# Patient Record
Sex: FEMALE | Race: WHITE | NOT HISPANIC OR LATINO | Employment: FULL TIME
[De-identification: names, ages, dates, MRNs, and addresses within clinical notes are randomized per-mention and may not be internally consistent; named-entity substitution may affect disease eponyms.]

---

## 2017-04-04 DIAGNOSIS — B00.1 COLD SORE: ICD-10-CM

## 2017-04-04 RX ORDER — VALACYCLOVIR HYDROCHLORIDE 1 G/1
TABLET, FILM COATED ORAL
Qty: 12 TABLET | Refills: 0 | Status: SHIPPED | OUTPATIENT
Start: 2017-04-04 | End: 2017-06-14

## 2017-04-04 NOTE — TELEPHONE ENCOUNTER
valACYclovir (VALTREX) 1000 mg tablet    Last Written Prescription Date:  8/1/16  Last Fill Quantity: 12,   # refills: 1  Last [place] [facility] [place]:  10/31/16 Post partum visit  Future Office visit: none    Routing refill request to provider for review/approval because:  Refill sent per [place] protocol

## 2017-06-03 ENCOUNTER — HEALTH MAINTENANCE LETTER (OUTPATIENT)
Age: 32
End: 2017-06-03

## 2017-11-13 ENCOUNTER — TRANSFERRED RECORDS (OUTPATIENT)
Dept: HEALTH INFORMATION MANAGEMENT | Facility: CLINIC | Age: 32
End: 2017-11-13

## 2017-12-02 ENCOUNTER — NURSE TRIAGE (OUTPATIENT)
Dept: NURSING | Facility: CLINIC | Age: 32
End: 2017-12-02

## 2017-12-02 NOTE — TELEPHONE ENCOUNTER
Sherie was due for menstruation a couple of days ago.  Wednesday started feeling nauseated and then diarrhea started on Wednesday.    No fever.  No vomiting.  No cold symptoms.

## 2017-12-02 NOTE — TELEPHONE ENCOUNTER
Additional Information    Negative: Shock suspected (e.g., cold/pale/clammy skin, too weak to stand, low BP, rapid pulse)    Negative: Difficult to awaken or acting confused  (e.g., disoriented, slurred speech)    Negative: Sounds like a life-threatening emergency to the triager    Negative: Vomiting also present and worse than the diarrhea    Negative: [1] Blood in stool AND [2] without diarrhea    Negative: [1] SEVERE abdominal pain (e.g., excruciating) AND [2] present > 1 hour    Negative: [1] SEVERE abdominal pain AND [2] age > 60    Negative: [1] Blood in the stool AND [2] moderate or large amount of blood    Negative: Black or tarry bowel movements  (Exception: chronic-unchanged  black-grey bowel movements AND is taking iron pills or Pepto-bismol)    Negative: [1] Drinking very little AND [2] dehydration suspected (e.g., no urine > 12 hours, very dry mouth, very lightheaded)    Negative: Patient sounds very sick or weak to the triager    Negative: [1] SEVERE diarrhea (e.g., 7 or more times / day more than normal) AND [2]  age > 60 years    Negative: [1] Constant abdominal pain AND [2] present > 2 hours    Negative: [1] Fever > 103 F (39.4 C) AND [2] not able to get the fever down using Fever Care Advice    Negative: [1] SEVERE diarrhea (e.g., 7 or more times / day more than normal) AND [2] present > 24 hours (1 day)    Negative: [1] MODERATE diarrhea (e.g., 4-6 times / day more than normal) AND [2] present > 48 hours (2 days)    Negative: [1] MODERATE diarrhea (e.g., 4-6 times / day more than normal) AND [2] age > 70 years    Negative: Fever > 101 F (38.3 C)    Negative: Fever present > 3 days (72 hours)    Negative: Abdominal pain  (Exception: Pain clears with each passage of diarrhea stool)    Negative: [1] Blood in the stool AND [2] small amount of blood   (Exception: only on toilet paper. Reason: diarrhea can cause rectal irritation with blood on wiping)    Negative: [1] Mucus or pus in stool AND [2]  present > 2 days AND [3] diarrhea is more than mild    Negative: [1] Recent antibiotic therapy (i.e., within last 2 months) AND [2] > 3 days since antibiotic was stopped    Negative: Weak immune system (e.g., HIV positive, cancer chemo, splenectomy, organ transplant, chronic steroids)    Negative: Tube feedings (e.g., nasogastric, g-tube, j-tube)    Negative: Travel to a foreign country in past month    Negative: [1] MILD (e.g., 1-3 or more stools than normal in past 24 hours) diarrhea without known cause AND [2] present >  7 days    Negative: Diarrhea is a chronic symptom (recurrent or ongoing AND present > 4 weeks)    Negative: SEVERE diarrhea (e.g., 7 or more times / day more than normal) (all triage questions negative)    MILD-MODERATE diarrhea (e.g., 1-6 times / day more than normal) (all triage questions negative)    Protocols used: DIARRHEA-ADULT-

## 2018-01-13 DIAGNOSIS — B00.1 COLD SORE: ICD-10-CM

## 2018-01-17 RX ORDER — VALACYCLOVIR HYDROCHLORIDE 1 G/1
TABLET, FILM COATED ORAL
Qty: 12 TABLET | Refills: 0 | OUTPATIENT
Start: 2018-01-17

## 2018-01-17 NOTE — TELEPHONE ENCOUNTER
valACYclovir (VALTREX) 1000 mg tablet     Last Written Prescription Date:  6/15/17  Last Fill Quantity: 12,   # refills: 1  [place] [facility]:  10/31/16  Future Office visit: none    Routing refill request to provider for review/approval because:  Pt needs appointment. Refill denied.

## 2018-01-23 RX ORDER — VALACYCLOVIR HYDROCHLORIDE 1 G/1
TABLET, FILM COATED ORAL
Qty: 12 TABLET | Refills: 0 | Status: SHIPPED | OUTPATIENT
Start: 2018-01-23 | End: 2018-02-26

## 2018-02-26 ENCOUNTER — OFFICE VISIT (OUTPATIENT)
Dept: OBGYN | Facility: CLINIC | Age: 33
End: 2018-02-26
Payer: COMMERCIAL

## 2018-02-26 VITALS
HEART RATE: 68 BPM | DIASTOLIC BLOOD PRESSURE: 64 MMHG | WEIGHT: 183 LBS | SYSTOLIC BLOOD PRESSURE: 112 MMHG | HEIGHT: 67 IN | BODY MASS INDEX: 28.72 KG/M2

## 2018-02-26 DIAGNOSIS — Z01.419 ENCOUNTER FOR GYNECOLOGICAL EXAMINATION WITHOUT ABNORMAL FINDING: Primary | ICD-10-CM

## 2018-02-26 DIAGNOSIS — Z31.9 PROCREATIVE MANAGEMENT: ICD-10-CM

## 2018-02-26 DIAGNOSIS — B00.1 COLD SORE: ICD-10-CM

## 2018-02-26 PROCEDURE — 99395 PREV VISIT EST AGE 18-39: CPT | Performed by: OBSTETRICS & GYNECOLOGY

## 2018-02-26 PROCEDURE — G0145 SCR C/V CYTO,THINLAYER,RESCR: HCPCS | Performed by: OBSTETRICS & GYNECOLOGY

## 2018-02-26 PROCEDURE — 87624 HPV HI-RISK TYP POOLED RSLT: CPT | Performed by: OBSTETRICS & GYNECOLOGY

## 2018-02-26 ASSESSMENT — ANXIETY QUESTIONNAIRES
IF YOU CHECKED OFF ANY PROBLEMS ON THIS QUESTIONNAIRE, HOW DIFFICULT HAVE THESE PROBLEMS MADE IT FOR YOU TO DO YOUR WORK, TAKE CARE OF THINGS AT HOME, OR GET ALONG WITH OTHER PEOPLE: NOT DIFFICULT AT ALL
5. BEING SO RESTLESS THAT IT IS HARD TO SIT STILL: NOT AT ALL
1. FEELING NERVOUS, ANXIOUS, OR ON EDGE: NOT AT ALL
GAD7 TOTAL SCORE: 1
7. FEELING AFRAID AS IF SOMETHING AWFUL MIGHT HAPPEN: SEVERAL DAYS
2. NOT BEING ABLE TO STOP OR CONTROL WORRYING: NOT AT ALL
3. WORRYING TOO MUCH ABOUT DIFFERENT THINGS: NOT AT ALL
6. BECOMING EASILY ANNOYED OR IRRITABLE: NOT AT ALL

## 2018-02-26 ASSESSMENT — PATIENT HEALTH QUESTIONNAIRE - PHQ9: 5. POOR APPETITE OR OVEREATING: NOT AT ALL

## 2018-02-26 NOTE — MR AVS SNAPSHOT
"              After Visit Summary   2018    Sherie Young    MRN: [id number]           Patient Information     Date Of Birth          1985        Visit Information        Provider Department      2018 2:00 PM Debby Sen MD [facility]        Today's Diagnoses     Encounter for gynecological examination without abnormal finding    -  1    Cold sore        Procreative management           Follow-ups after your visit        Who to contact     If you have questions or need follow up information about today's clinic visit or your schedule please contact [facility] directly at [phone].  Normal or non-critical lab and imaging results will be communicated to you by [employer], letter or phone within 4 business days after the clinic has received the results. If you do not hear from us within 7 days, please contact the clinic through [employer] or phone. If you have a critical or abnormal lab result, we will notify you by phone as soon as possible.  Submit refill requests through [employer] or call your pharmacy and they will forward the refill request to us. Please allow 3 business days for your refill to be completed.          Additional Information About Your Visit        MyChart Information     [employer] lets you send messages to your doctor, view your test results, renew your prescriptions, schedule appointments and more. To sign up, go to [email or web address] . Click on \"Log in\" on the left side of the screen, which will take you to the Welcome page. Then click on \"Sign up Now\" on the right side of the page.     You will be asked to enter the access code listed below, as well as some personal information. Please follow the directions to create your username and password.     Your access code is: [email or web address]  Expires: 2018 12:35 PM     Your access code will  in 90 days. If you need help or a new code, please call your [place] clinic or " "[phone].        Care EveryWhere ID     This is your Care EveryWhere ID. This could be used by other organizations to access your [facility] medical records  [id number]        Your Vitals Were     Pulse Height Last Period Breastfeeding? BMI (Body Mass Index)       68 5' 7\" (1.702 m) 02/07/2018 No 28.66 kg/m2        Blood Pressure from Last 3 Encounters:   02/26/18 112/64   10/31/16 120/80   09/26/16 135/78    Weight from Last 3 Encounters:   02/26/18 183 lb (83 kg)   10/31/16 203 lb (92.1 kg)   09/19/16 222 lb (100.7 kg)              We Performed the Following     HPV High Risk Types DNA Cervical     Pap imaged thin layer screen with HPV - recommended age 30 - 65          Where to get your medicines      These medications were sent to [facility] [address] [place] [address] [facility] & [address] [address]     Phone:  [phone]     valACYclovir 1000 mg tablet          Primary Care Provider Office Phone # Fax #    Debby Sen [phone] [address] [address]        Equal Access to Services     LUCIA CAMPBELL AH: Hadii devora mao hadasho Soomaali, waaxda luqadaha, qaybta kaalmada adeegyada, inez santiago. So [facility] [phone].    ATENCIÓN: Si habla español, tiene a reyna disposición servicios gratuitos de asistencia lingüística. Llame al [phone].    We comply with applicable federal civil rights laws and [place] laws. We do not discriminate on the basis of race, color, national origin, age, disability, sex, sexual orientation, or gender identity.            Thank you!     Thank you for choosing [facility]  for your care. Our goal is always to provide you with excellent care. Hearing back from our patients is one way we can continue to improve our services. Please take a few minutes to complete the written survey that you may receive in the mail after your visit " with us. Thank you!             Your Updated Medication List - Protect others around you: Learn how to safely use, store and throw away your medicines at www.disposemymeds.org.          This list is accurate as of 2/26/18 11:59 PM.  Always use your most recent med list.                   Brand Name Dispense Instructions for use Diagnosis    Albuterol Sulfate 108 (90 BASE) MCG/ACT Aepb           Prenatal Vitamins 28-0.8 MG Tabs      Take 1 tablet by mouth daily        valACYclovir 1000 mg tablet    VALTREX    12 tablet    TAKE 2 TABLETS BY MOUTH TWICE DAILY FOR 1 DAYS    Cold sore

## 2018-02-26 NOTE — PROGRESS NOTES
Sherie is a 32 year old  female who presents for annual exam.     Besides routine health maintenance,  she would like to discuss traveling and Zeka.    HPI:  The patient's PCP is  Debby Sen MD.    Patient is doing great. Work life balance is definitely a struggle now that has a toddler but loving every minute of it.   Doing some traveling for work and needing to go to [facility] and [facility]. Sister is also getting  next fall and wants to do a trip to [facility] for a bachelorette party. Trying for another baby but also needs to figure out the logistics of those things. Questions about Zika, travel, etc  Patient has been off her ocps for several months.  Periods are regular and doing opks for about 3 months or so and getting a pos on day 8-11 or so. Didn't do opks with letha until after already pregnant and started on day 15 with him so thinks that she's always ovulated a little on early side b/c never got a positive OPK that month and had already likely conceived. Got pregnant with letha month 2 after her SAB and got pregnant right away with the SAB  Didn't contracept this month but didn't necessarily try b/c that would make her EDC right at her sister's wedding. Too early to check a UPT for this month as her period was about 3.5 weeks ago.    Has worked really hard on diet and doing 90% vegan and low carb. Working out but sporadically b/c not enough time. Has gotten back to about the weight she was when got preg with letha. Ideally ould like to be 160s-170s but not going to stress about it until done having kids.    Patient has a strong fhx of breast cancer and ovarian cancer on her dad's side. Dad has refused to get genetic testing of any kind but has always been asked in a big group setting and never one on one so may try again tonight b/c seeing him. Thinks she'd probably just test herself if he won't      GYNECOLOGIC HISTORY:    Her current contraception method is:  none  She  reports that  she has never smoked. She has never used smokeless tobacco.    Patient is sexually active.  STD testing offered?  Declined  Last PHQ-9 score on record =   PHQ-9 SCORE 2018   Total Score 2     Last GAD7 score on record =   MEDINA-7 SCORE 2018   Total Score 1     Alcohol Score = 0    HEALTH MAINTENANCE:  Cholesterol: (No results found for: CHOL Has done @ Work yearly.  Last Mammo: Never had done, Result: not applicable, Next Mammo: Due at age 38.  Pap: 2013 Neg, HPV Neg  Colonoscopy:  Never had done, Result: not applicable, Next Colonoscopy: Due at age 48.  Dexa:  NA    Health maintenance updated:  yes    HISTORY:  Obstetric History       T1      L1     SAB1   TAB0   Ectopic0   Multiple0   Live Births1       # Outcome Date GA Lbr Ashish/2nd Weight Sex Delivery Anes PTL Lv   2 Term 16 40w0d 07:20 / 05:29 7 lb 13.9 oz (3.57 kg) M CS-LTranv EPI N VONDA      Name: Dustin      Apgar1:  6                Apgar5: 8   1 SAB 09/01/15     SAB             Patient Active Problem List   Diagnosis     Cold sore     Past Surgical History:   Procedure Laterality Date      SECTION N/A 2016    Procedure:  SECTION;  Surgeon: Debby Sen MD;  Location:  L+D     NO HISTORY OF SURGERY        Social History   Substance Use Topics     Smoking status: Never Smoker     Smokeless tobacco: Never Used     Alcohol use No      Problem (# of Occurrences) Relation (Name,Age of Onset)    Breast Cancer (2) Paternal Aunt, Other: PGGM    Ovarian Cancer (2) Paternal Grandmother, Paternal Aunt    Prostate Cancer (1) Paternal Uncle (50)            Current Outpatient Prescriptions   Medication Sig     valACYclovir (VALTREX) 1000 mg tablet TAKE 2 TABLETS BY MOUTH TWICE DAILY FOR 1 DAYS     norethindrone (MICRONOR) 0.35 MG per tablet Take 1 tablet (0.35 mg) by mouth daily     Albuterol Sulfate 108 (90 BASE) MCG/ACT AEPB      Prenatal Vit-Fe Fumarate-FA (PRENATAL VITAMINS) 28-0.8 MG TABS Take 1 tablet by mouth  "daily      No current facility-administered medications for this visit.      Allergies   Allergen Reactions     Seasonal Allergies        Past medical, surgical, social and family histories were reviewed and updated in EPIC.    ROS:   12 point review of systems negative other than symptoms noted below.  Head: Sore Throat    EXAM:  /64  Pulse 68  Ht 5' 7\" (1.702 m)  Wt 183 lb (83 kg)  LMP 02/07/2018  Breastfeeding? No  BMI 28.66 kg/m2   BMI: Body mass index is 28.66 kg/(m^2).    PHYSICAL EXAM:  Constitutional:  Appearance: Well nourished, well developed, alert, in no acute distress  Neck:  Lymph Nodes:  No lymphadenopathy present    Thyroid:  Gland size normal, nontender, no nodules or masses present  on palpation  Chest:  Respiratory Effort:  Breathing unlabored  Cardiovascular:    Heart: Auscultation:  Regular rate, normal rhythm, no murmurs present  Breasts: Palpation of Breasts and Axillae:  No masses present on palpation, no breast tenderness. and No nodularity, asymmetry or nipple discharge bilaterally.  Gastrointestinal:   Abdominal Examination:  Abdomen nontender to palpation, tone normal without rigidity or guarding, no masses present, umbilicus without lesions   Liver and Spleen:  No hepatomegaly present, liver nontender to palpation    Hernias:  No hernias present  Lymphatic: Lymph Nodes:  No other lymphadenopathy present  Skin:  General Inspection:  No rashes present, no lesions present, no areas of  discoloration    Genitalia and Groin:  No rashes present, no lesions present, no areas of  discoloration, no masses present  Neurologic/Psychiatric:    Mental Status:  Oriented X3     Pelvic Exam:  External Genitalia:     Normal appearance for age, no discharge present, no tenderness present, no inflammatory lesions present, color normal  Vagina:     Normal vaginal vault without central or paravaginal defects, no discharge present, no inflammatory lesions present, no masses present  Bladder:  "    Nontender to palpation  Urethra:   Urethral Body:  Urethra palpation normal, urethra structural support normal   Urethral Meatus:  No erythema or lesions present  Cervix:     Appearance healthy, no lesions present, nontender to palpation, no bleeding present  Uterus:     Uterus: firm, normal sized and nontender, anteverted in position.   Adnexa:     No adnexal tenderness present, no adnexal masses present  Perineum:     Perineum within normal limits, no evidence of trauma, no rashes or skin lesions present  Anus:     Anus within normal limits, no hemorrhoids present  Inguinal Lymph Nodes:     No lymphadenopathy present  Pubic Hair:     Normal pubic hair distribution for age  Genitalia and Groin:     No rashes present, no lesions present, no areas of discoloration, no masses present      COUNSELING:   Reviewed preventive health counseling, as reflected in patient instructions  Special attention given to:        cancer history in paternal family       Regular exercise       Healthy diet/nutrition       Family planning    BMI: Body mass index is 28.66 kg/(m^2).  Weight management plan: Discussed healthy diet and exercise guidelines and patient will follow up in 12 months in clinic to re-evaluate.    ASSESSMENT:  32 year old female with satisfactory annual exam.    ICD-10-CM    1. Encounter for gynecological examination without abnormal finding Z01.419 Pap imaged thin layer screen with HPV - recommended age 30 - 65     HPV High Risk Types DNA Cervical   2. Cold sore B00.1 valACYclovir (VALTREX) 1000 mg tablet   3. Procreative management Z31.9        PLAN:  Pap and cotesting done today  Continue to try for pregnancy and if not pregnant in another 4-5 months can at least do some lab testing. Likely everything is fine since conceived easily with SAB and Dustin and does show that she's ovulating even if slightly early  Refill valtrex for cold sores    Discussed her fhx and asking her dad to get testing. If he was neg then  her and her sister wouldn't need testing. If he was pos then 50/50 chance and she could test. However would strongly advise against testing now b/c if she's positive but about to try for another baby then we wouldn't be able to intervene anyway and it would just worry her more to know she has a gene and we can't intervene. After having her second child could then certainly consider it b/c then would at least start mammo/MRI and would consider ocps for ovarian cancer. Could also do salpingectomy if done with childbirth and no BSO. Could refer to genetic counseling as well. Patient will consider it and talk to her dad and sister more.    Debby Sen MD

## 2018-02-26 NOTE — LETTER
March 5, 2018    Sherie Gallegos Hector  [address]  [facility] [address]    Dear Sherie,  We are happy to inform you that your PAP smear result from 2/26/18 is normal.  We are now able to do a follow up test on PAP smears. The DNA test is for HPV (Human Papilloma Virus). Cervical cancer is closely linked with certain types of HPV. Your result showed no evidence of high risk HPV.  Therefore we recommend you return in 3 years for your next pap smear.  You will still need to return to the clinic every year for an annual exam and other preventive tests.  Please contact the clinic at [phone] with any questions.  Sincerely,    Debby Sen MD/josé miguel

## 2018-02-27 RX ORDER — VALACYCLOVIR HYDROCHLORIDE 1 G/1
TABLET, FILM COATED ORAL
Qty: 12 TABLET | Refills: 0 | Status: SHIPPED | OUTPATIENT
Start: 2018-02-27 | End: 2018-09-09

## 2018-02-27 ASSESSMENT — PATIENT HEALTH QUESTIONNAIRE - PHQ9: SUM OF ALL RESPONSES TO PHQ QUESTIONS 1-9: 2

## 2018-02-27 ASSESSMENT — ANXIETY QUESTIONNAIRES: GAD7 TOTAL SCORE: 1

## 2018-02-28 LAB
COPATH REPORT: NORMAL
PAP: NORMAL

## 2018-03-02 LAB
FINAL DIAGNOSIS: NORMAL
HPV HR 12 DNA CVX QL NAA+PROBE: NEGATIVE
HPV16 DNA SPEC QL NAA+PROBE: NEGATIVE
HPV18 DNA SPEC QL NAA+PROBE: NEGATIVE
SPECIMEN DESCRIPTION: NORMAL
SPECIMEN SOURCE CVX/VAG CYTO: NORMAL

## 2018-05-04 DIAGNOSIS — O36.80X0 ENCOUNTER TO DETERMINE FETAL VIABILITY OF PREGNANCY: Primary | ICD-10-CM

## 2018-05-08 ENCOUNTER — RADIANT APPOINTMENT (OUTPATIENT)
Dept: ULTRASOUND IMAGING | Facility: CLINIC | Age: 33
End: 2018-05-08
Payer: COMMERCIAL

## 2018-05-08 ENCOUNTER — PRENATAL OFFICE VISIT (OUTPATIENT)
Dept: OBGYN | Facility: CLINIC | Age: 33
End: 2018-05-08
Payer: COMMERCIAL

## 2018-05-08 VITALS
SYSTOLIC BLOOD PRESSURE: 120 MMHG | HEIGHT: 67 IN | WEIGHT: 185.8 LBS | DIASTOLIC BLOOD PRESSURE: 74 MMHG | HEART RATE: 81 BPM | BODY MASS INDEX: 29.16 KG/M2

## 2018-05-08 DIAGNOSIS — O36.80X0 ENCOUNTER TO DETERMINE FETAL VIABILITY OF PREGNANCY: ICD-10-CM

## 2018-05-08 DIAGNOSIS — O09.521 SUPERVISION OF HIGH-RISK PREGNANCY OF ELDERLY MULTIGRAVIDA, FIRST TRIMESTER: Primary | ICD-10-CM

## 2018-05-08 DIAGNOSIS — O34.219 HISTORY OF CESAREAN SECTION COMPLICATING PREGNANCY: ICD-10-CM

## 2018-05-08 PROBLEM — O09.529 SUPERVISION OF HIGH-RISK PREGNANCY OF ELDERLY MULTIGRAVIDA: Status: ACTIVE | Noted: 2018-05-08

## 2018-05-08 LAB
ABO + RH BLD: NORMAL
ABO + RH BLD: NORMAL
ALBUMIN UR-MCNC: ABNORMAL MG/DL
APPEARANCE UR: CLEAR
BACTERIA #/AREA URNS HPF: ABNORMAL /HPF
BASOPHILS # BLD AUTO: 0.1 10E9/L (ref 0–0.2)
BASOPHILS NFR BLD AUTO: 1 %
BILIRUB UR QL STRIP: NEGATIVE
BLD GP AB SCN SERPL QL: NORMAL
BLOOD BANK CMNT PATIENT-IMP: NORMAL
COLOR UR AUTO: YELLOW
DIFFERENTIAL METHOD BLD: ABNORMAL
EOSINOPHIL # BLD AUTO: 0.2 10E9/L (ref 0–0.7)
EOSINOPHIL NFR BLD AUTO: 3 %
ERYTHROCYTE [DISTWIDTH] IN BLOOD BY AUTOMATED COUNT: 12.3 % (ref 10–15)
GLUCOSE UR STRIP-MCNC: NEGATIVE MG/DL
HCT VFR BLD AUTO: 34.7 % (ref 35–47)
HGB BLD-MCNC: 11.8 G/DL (ref 11.7–15.7)
HGB UR QL STRIP: NEGATIVE
KETONES UR STRIP-MCNC: NEGATIVE MG/DL
LEUKOCYTE ESTERASE UR QL STRIP: ABNORMAL
LYMPHOCYTES # BLD AUTO: 1.9 10E9/L (ref 0.8–5.3)
LYMPHOCYTES NFR BLD AUTO: 37 %
MCH RBC QN AUTO: 30.8 PG (ref 26.5–33)
MCHC RBC AUTO-ENTMCNC: 34 G/DL (ref 31.5–36.5)
MCV RBC AUTO: 91 FL (ref 78–100)
MONOCYTES # BLD AUTO: 0.3 10E9/L (ref 0–1.3)
MONOCYTES NFR BLD AUTO: 6 %
NEUTROPHILS # BLD AUTO: 2.6 10E9/L (ref 1.6–8.3)
NEUTROPHILS NFR BLD AUTO: 53 %
NITRATE UR QL: NEGATIVE
NON-SQ EPI CELLS #/AREA URNS LPF: ABNORMAL /LPF
PH UR STRIP: 8.5 PH (ref 5–7)
PLATELET # BLD AUTO: 210 10E9/L (ref 150–450)
RBC # BLD AUTO: 3.83 10E12/L (ref 3.8–5.2)
RBC #/AREA URNS AUTO: ABNORMAL /HPF
SOURCE: ABNORMAL
SP GR UR STRIP: 1.01 (ref 1–1.03)
SPECIMEN EXP DATE BLD: NORMAL
UROBILINOGEN UR STRIP-ACNC: 0.2 EU/DL (ref 0.2–1)
WBC # BLD AUTO: 5.1 10E9/L (ref 4–11)
WBC #/AREA URNS AUTO: ABNORMAL /HPF

## 2018-05-08 PROCEDURE — 76817 TRANSVAGINAL US OBSTETRIC: CPT | Performed by: OBSTETRICS & GYNECOLOGY

## 2018-05-08 PROCEDURE — 36415 COLL VENOUS BLD VENIPUNCTURE: CPT | Performed by: OBSTETRICS & GYNECOLOGY

## 2018-05-08 PROCEDURE — 86850 RBC ANTIBODY SCREEN: CPT | Performed by: OBSTETRICS & GYNECOLOGY

## 2018-05-08 PROCEDURE — 86762 RUBELLA ANTIBODY: CPT | Performed by: OBSTETRICS & GYNECOLOGY

## 2018-05-08 PROCEDURE — 99207 ZZC FIRST OB VISIT: CPT | Performed by: OBSTETRICS & GYNECOLOGY

## 2018-05-08 PROCEDURE — 87086 URINE CULTURE/COLONY COUNT: CPT | Performed by: OBSTETRICS & GYNECOLOGY

## 2018-05-08 PROCEDURE — 86780 TREPONEMA PALLIDUM: CPT | Performed by: OBSTETRICS & GYNECOLOGY

## 2018-05-08 PROCEDURE — 87389 HIV-1 AG W/HIV-1&-2 AB AG IA: CPT | Performed by: OBSTETRICS & GYNECOLOGY

## 2018-05-08 PROCEDURE — 87340 HEPATITIS B SURFACE AG IA: CPT | Performed by: OBSTETRICS & GYNECOLOGY

## 2018-05-08 PROCEDURE — 81001 URINALYSIS AUTO W/SCOPE: CPT | Performed by: OBSTETRICS & GYNECOLOGY

## 2018-05-08 PROCEDURE — 86900 BLOOD TYPING SEROLOGIC ABO: CPT | Performed by: OBSTETRICS & GYNECOLOGY

## 2018-05-08 PROCEDURE — 86901 BLOOD TYPING SEROLOGIC RH(D): CPT | Performed by: OBSTETRICS & GYNECOLOGY

## 2018-05-08 PROCEDURE — 85025 COMPLETE CBC W/AUTO DIFF WBC: CPT | Performed by: OBSTETRICS & GYNECOLOGY

## 2018-05-08 ASSESSMENT — ANXIETY QUESTIONNAIRES
1. FEELING NERVOUS, ANXIOUS, OR ON EDGE: NOT AT ALL
6. BECOMING EASILY ANNOYED OR IRRITABLE: NOT AT ALL
3. WORRYING TOO MUCH ABOUT DIFFERENT THINGS: NOT AT ALL
5. BEING SO RESTLESS THAT IT IS HARD TO SIT STILL: NOT AT ALL
7. FEELING AFRAID AS IF SOMETHING AWFUL MIGHT HAPPEN: NOT AT ALL
2. NOT BEING ABLE TO STOP OR CONTROL WORRYING: NOT AT ALL
IF YOU CHECKED OFF ANY PROBLEMS ON THIS QUESTIONNAIRE, HOW DIFFICULT HAVE THESE PROBLEMS MADE IT FOR YOU TO DO YOUR WORK, TAKE CARE OF THINGS AT HOME, OR GET ALONG WITH OTHER PEOPLE: NOT DIFFICULT AT ALL
GAD7 TOTAL SCORE: 0

## 2018-05-08 ASSESSMENT — PATIENT HEALTH QUESTIONNAIRE - PHQ9: 5. POOR APPETITE OR OVEREATING: NOT AT ALL

## 2018-05-08 NOTE — PROGRESS NOTES
Migraine once  inatal end next week  Ob check with [facility] at 12 wks  mfm order placed for level II

## 2018-05-08 NOTE — PROGRESS NOTES
FOB: Giovany  Jessica: Desires    This is a 33 year old female patient,   who presents for her first obstetrical visit.    Patient's last menstrual period was 2018 (exact date)..  This gives her an EDC of Dec 12, 2018 .  She is 8w6d.  Her cycles are regular.  Her last menstrual period was normal.  She has had an ultrasound on 18 which showed viable IUP measuring 8w6d. .  Since her LMP, she has experienced  nausea, emesis and migraines with aura, constipation, fatigue, urinary frequency).  She denies abdominal pain, loss of appetite, vaginal discharge, dysuria, pelvic pain, urinary urgency, lightheadedness, urinary frequency, vaginal bleeding and hemorrhoids.    Prior c/s after pushing 2 yrs, temp, fetal tachycardia, 24 hr labor  prob not good  candidate  Will discuss further with Dr Sen to decide    Discussed AMA, testing options  Plans inatal at 10 wks, level II us after 18 wks  Labs today    Migraines occ, once with aura      Discussed travel and zika  Discussed exercise    Lab Results   Component Value Date    PAP NIL 2018     No hx abnormal paps    Past History:  Her past medical history   Past Medical History:   Diagnosis Date     Asthma     exercise induced     Cold sore 2016     HSV-1 (herpes simplex virus 1) infection      Migraines     historical, now w/aura this pregnancy     Miscarriage    .      She has a history of  prior  section and induction of labor after SROM without natural onset of labor    Since her last LMP she denies use of alcohol, tobacco and street drugs.    HISTORY:  Obstetric History       T1      L1     SAB1   TAB0   Ectopic0   Multiple0   Live Births1       # Outcome Date GA Lbr Ashish/2nd Weight Sex Delivery Anes PTL Lv   3 Current            2 Term 16 40w0d 07:20 / 05:29 7 lb 13.9 oz (3.57 kg) M CS-LTranv EPI N VONDA      Name: Dustin      Apgar1:  6                Apgar5: 8   1 SAB 09/01/15     SAB           Past Medical  History:   Diagnosis Date     Asthma     exercise induced     Cold sore 2016     HSV-1 (herpes simplex virus 1) infection      Migraines     historical, now w/aura this pregnancy     Miscarriage      Past Surgical History:   Procedure Laterality Date      SECTION N/A 2016    Procedure:  SECTION;  Surgeon: Debby Sen MD;  Location:  L+D     Family History   Problem Relation Age of Onset     Ovarian Cancer Paternal Grandmother      Breast Cancer Paternal Grandmother      Ovarian Cancer Maternal Aunt      Breast Cancer Maternal Aunt      Breast Cancer Other      PGGM     Prostate Cancer Paternal Uncle 50     HEART DISEASE Maternal Grandfather      Aneurysm Maternal Grandmother 80     Aneurysm Paternal Grandfather      Social History     Social History     Marital status:      Spouse name: Ta     Number of children: 0     Years of education: N/A     Occupational History      Other     [employer]     Social History Main Topics     Smoking status: Never Smoker     Smokeless tobacco: Never Used     Alcohol use No     Drug use: No     Sexual activity: Yes     Partners: Male     Birth control/ protection: None      Comment: currently pregnant     Other Topics Concern     Not on file     Social History Narrative    Remarried - Ta     - [employer]    No advanced care directives on file         Current Outpatient Prescriptions   Medication Sig     Acetaminophen (TYLENOL PO) Take 650 mg by mouth every 4 hours as needed for mild pain or fever (migraines)     Albuterol Sulfate 108 (90 BASE) MCG/ACT AEPB      Prenatal Vit-Fe Fumarate-FA (PRENATAL VITAMINS) 28-0.8 MG TABS Take 1 tablet by mouth daily      valACYclovir (VALTREX) 1000 mg tablet TAKE 2 TABLETS BY MOUTH TWICE DAILY FOR 1 DAYS     No current facility-administered medications for this visit.      Allergies   Allergen Reactions     Seasonal Allergies        Past medical, surgical,  "social and family history were reviewed and updated in EPIC.    ROS:   12 point review of systems negative other than symptoms noted below.  Constitutional: Fatigue  Head: Nasal Congestion  Gastrointestinal: Constipation, Nausea and Vomiting  Genitourinary: frequency  Neurologic: Headaches, migraines w/aura    EXAM:  /74 (BP Location: Right arm, Patient Position: Sitting, Cuff Size: Adult Regular)  Pulse 81  Ht 5' 7.44\" (1.713 m)  Wt 185 lb 12.8 oz (84.3 kg)  LMP 03/07/2018 (Exact Date)  Breastfeeding? No  BMI 28.72 kg/m2   BMI: Body mass index is 28.72 kg/(m^2).    EXAM:  Constitutional:  Appearance: Well nourished, well developed alert, in no acute distress.    Chest:  Respiratory Effort:  Breathing unlabored.  .  Skin:  General Inspection:  No rashes present, no lesions present, no areas of  discoloration.      Neurologic/Psychiatric:    Mental Status:  Oriented X3.    No Pelvic Exam performed    ASSESSMENT:      ICD-10-CM    1. Supervision of high-risk pregnancy of elderly multigravida, first trimester O09.521 ABO/Rh type and screen     CBC with platelets differential     Hepatitis B surface antigen     HIV Antigen Antibody Combo     Urine Culture Aerobic Bacterial     UA with Microscopic     Rubella Antibody IgG Quantitative     Treponema Abs w Reflex to RPR and Titer     Acetaminophen (TYLENOL PO)     Non Invasive Prenatal Test Cell Free DNA       PLAN/PATIENT INSTRUCTIONS:    There are no Patient Instructions on file for this visit.    Labs today  inatal end of next week  Ob check with Dr Sen at 12 wks    Eula Middleton MD  "

## 2018-05-08 NOTE — NURSING NOTE
[facility] Obstetrical Risk History    Patient presents for new OB labs and teaching.      1. Please indicate any condition you have or have had in the past:  Asthma, Migraine, c/s, HSV1, SAB  2. Do you smoke?  No  If yes, how many packs/day?   3. Do you drink alcoholic beverages? No  If yes, how often?  What type?   4. List any medications taken since your last period: tylenol  5. List any recreational drugs (cocaine, marijuana, etc. used since your last period:None    6. List any chemical or radiation exposure that you've encountered: None  7. Are you on a restricted diet? No  If yes, please describe:  Do you have any Nondenominational objections to any form of treatment? No    GENETIC SCREENING    These questions apply to you, the baby's father, or anyone in either family with:    1. Patient's age 35 or greater at delivery? Yes  2. Setswana, Maltese, Mediterranean ancestry? No  3. Neural Tube Defect (Meningomyelocele, Spina Bifida, or Anencephaly)? No  4. Restorationism, Thai Bermudian or history of Silver-Sachs disease? Yes- Thai [place]ian grandmother/mother  5. Down's Syndrome?   No  6. Hemophilia or clotting disorder? No  7. Muscular Dystrophy? No  8. Cystic Fibrosis? No  9. Rawlins's Chorea? No  10. Mental Retardation? No  11. 3 or more miscarriages or a stillborn? No  12. Other inherited disease or chromosomal disorder? No  13. Have you or the baby's father had a child born with a birth defect? No  14. Did you or the baby's father have a birth defect yourselves? No    Do you have any other concerns about birth defects? No

## 2018-05-08 NOTE — MR AVS SNAPSHOT
After Visit Summary   2018    Sherie Young    MRN: [id number]           Patient Information     Date Of Birth          1985        Visit Information        Provider Department      2018 9:10 AM Eula Middleton MD; [facility] TRI[facility] [place]        Today's Diagnoses     Supervision of high-risk pregnancy of elderly multigravida, first trimester    -  1    History of  section complicating pregnancy           Follow-ups after your visit        Additional Services     MAT FETAL MED CTR REFERRAL-PREGNANCY       >> Patient may proceed with recommendations for further testing as directed by the Maternal Fetal Medicine Specialist >>    >> If requesting Fetal Echo: MFM will determine appropriate location for exam due to indication.    >> If requesting Lung Maturity Amnio:  If results indicate fetal lung maturity, induction or C/S is recommended within 36 hours.  Please schedule accordingly.     Dear Patient:   Please be aware that coverage of these services is subject to the terms and limitations of your health insurance plan.  Call member services at your health plan with any benefit or coverage questions.      Please bring the following to your appointment:    >>  Any x-rays, CTs or MRIs which have been performed.  Contact the facility where they were done to arrange for  prior to your scheduled appointment.  Any new CT, MRI or other procedures ordered by your specialist must be performed at a [facility] facility or coordinated by your clinic's referral office.  >>  List of current medications   >>  This referral request   >>  Any documents/labs given to you for this referral                  Future tests that were ordered for you today     Open Future Orders        Priority Expected Expires Ordered    Non Invasive Prenatal Test Cell Free DNA Routine 2018            Who to contact     If you have questions or need follow up information  "about today's clinic visit or your schedule please contact [facility] [place] directly at [phone].  Normal or non-critical lab and imaging results will be communicated to you by ScentAirhart, letter or phone within 4 business days after the clinic has received the results. If you do not hear from us within 7 days, please contact the clinic through ScentAirhart or phone. If you have a critical or abnormal lab result, we will notify you by phone as soon as possible.  Submit refill requests through Inxero or call your pharmacy and they will forward the refill request to us. Please allow 3 business days for your refill to be completed.          Additional Information About Your Visit        [email or web address] Information     Inxero lets you send messages to your doctor, view your test results, renew your prescriptions, schedule appointments and more. To sign up, go to [email or web address] . Click on \"Log in\" on the left side of the screen, which will take you to the Welcome page. Then click on \"Sign up Now\" on the right side of the page.     You will be asked to enter the access code listed below, as well as some personal information. Please follow the directions to create your username and password.     Your access code is: [email or web address]  Expires: 2018  9:34 AM     Your access code will  in 90 days. If you need help or a new code, please call your [place] clinic or [phone].        Care EveryWhere ID     This is your Care EveryWhere ID. This could be used by other organizations to access your [place] medical records  [id number]        Your Vitals Were     Pulse Height Last Period Breastfeeding? BMI (Body Mass Index)       81 5' 7.44\" (1.713 m) 2018 (Exact Date) No 28.72 kg/m2        Blood Pressure from Last 3 Encounters:   18 120/74   18 112/64   10/31/16 120/80    Weight from Last 3 Encounters:   18 185 lb 12.8 oz (84.3 kg)   18 183 lb (83 kg)   10/31/16 203 lb (92.1 " kg)              We Performed the Following     ABO/Rh type and screen     CBC with platelets differential     Hepatitis B surface antigen     HIV Antigen Antibody Combo     MAT FETAL MED CTR REFERRAL-PREGNANCY     Rubella Antibody IgG Quantitative     Treponema Abs w Reflex to RPR and Titer     UA with Microscopic     Urine Culture Aerobic Bacterial        Primary Care Provider Office Phone # Fax #    Debby Sen [phone] [address] [facility] [address]  [place] [address]        Equal Access to Services     KERRY CAMPBELL : Hadii aad ku hadasho Soomaali, waaxda luqadaha, qaybta kaalmada adeegyada, waxay idiin hayaan adeeg kharash larenetta . So [facility] [phone].    ATENCIÓN: Si sobeida keane, tiene a reyna disposición servicios gratuitos de asistencia lingüística. [facility] [phone].    We comply with applicable federal civil rights laws and [place] laws. We do not discriminate on the basis of race, color, national origin, age, disability, sex, sexual orientation, or gender identity.            Thank you!     Thank you for choosing [facility] [place]  for your care. Our goal is always to provide you with excellent care. Hearing back from our patients is one way we can continue to improve our services. Please take a few minutes to complete the written survey that you may receive in the mail after your visit with us. Thank you!             Your Updated Medication List - Protect others around you: Learn how to safely use, store and throw away your medicines at www.disposemymeds.org.          This list is accurate as of 5/8/18 10:23 AM.  Always use your most recent med list.                   Brand Name Dispense Instructions for use Diagnosis    Albuterol Sulfate 108 (90 Base) MCG/ACT Aepb           Prenatal Vitamins 28-0.8 MG Tabs      Take 1 tablet by mouth daily        TYLENOL PO      Take 650 mg by mouth every 4 hours as needed for mild pain or fever (migraines)    Supervision of  high-risk pregnancy of elderly multigravida, first trimester       valACYclovir 1000 mg tablet    VALTREX    12 tablet    TAKE 2 TABLETS BY MOUTH TWICE DAILY FOR 1 DAYS    Cold sore

## 2018-05-09 LAB
BACTERIA SPEC CULT: NORMAL
BACTERIA SPEC CULT: NORMAL
HBV SURFACE AG SERPL QL IA: NONREACTIVE
HIV 1+2 AB+HIV1 P24 AG SERPL QL IA: NONREACTIVE
Lab: NORMAL
RUBV IGG SERPL IA-ACNC: 11 IU/ML
SPECIMEN SOURCE: NORMAL
T PALLIDUM AB SER QL: NONREACTIVE

## 2018-05-09 ASSESSMENT — ANXIETY QUESTIONNAIRES: GAD7 TOTAL SCORE: 0

## 2018-05-09 ASSESSMENT — PATIENT HEALTH QUESTIONNAIRE - PHQ9: SUM OF ALL RESPONSES TO PHQ QUESTIONS 1-9: 2

## 2018-05-18 DIAGNOSIS — O09.521 SUPERVISION OF HIGH-RISK PREGNANCY OF ELDERLY MULTIGRAVIDA, FIRST TRIMESTER: ICD-10-CM

## 2018-05-18 PROCEDURE — 40000791 ZZHCL STATISTIC VERIFI PRENATAL TRISOMY 21,18,13: Mod: 90 | Performed by: OBSTETRICS & GYNECOLOGY

## 2018-05-18 PROCEDURE — 36415 COLL VENOUS BLD VENIPUNCTURE: CPT | Performed by: OBSTETRICS & GYNECOLOGY

## 2018-05-18 PROCEDURE — 99000 SPECIMEN HANDLING OFFICE-LAB: CPT | Performed by: OBSTETRICS & GYNECOLOGY

## 2018-05-30 ENCOUNTER — TELEPHONE (OUTPATIENT)
Dept: OBGYN | Facility: CLINIC | Age: 33
End: 2018-05-30

## 2018-05-30 DIAGNOSIS — O09.529 SUPERVISION OF HIGH-RISK PREGNANCY OF ELDERLY MULTIGRAVIDA: ICD-10-CM

## 2018-05-30 NOTE — TELEPHONE ENCOUNTER
Innatal results: Negative  Sex of baby disclosed via pt request via a voice message after we discussed results.    TEST RESULT INTERPRETATION   Chromosome 21 No aneuploidy detected  Results consistent with two copies of chromosome 21   Chromosome 18 No aneuploidy detected  Results consistent with two copies of chromosome 18   Chromosome 13 No aneuploidy detected  Results consistent with two copies of chromosome 13   Sex Chromosome No aneuploidy detected  Results consistent with two sex chromosomes:  female     Routing to Dr. Middleton as LULU BANDA RN

## 2018-06-04 ENCOUNTER — PRENATAL OFFICE VISIT (OUTPATIENT)
Dept: OBGYN | Facility: CLINIC | Age: 33
End: 2018-06-04
Payer: COMMERCIAL

## 2018-06-04 VITALS — SYSTOLIC BLOOD PRESSURE: 110 MMHG | BODY MASS INDEX: 29.43 KG/M2 | DIASTOLIC BLOOD PRESSURE: 70 MMHG | WEIGHT: 190.4 LBS

## 2018-06-04 DIAGNOSIS — O34.219 PREVIOUS CESAREAN SECTION COMPLICATING PREGNANCY: ICD-10-CM

## 2018-06-04 DIAGNOSIS — O09.529 SUPERVISION OF HIGH-RISK PREGNANCY OF ELDERLY MULTIGRAVIDA: Primary | ICD-10-CM

## 2018-06-04 LAB — NON INVASIVE PRENATAL TEST CELL FREE DNA: NORMAL

## 2018-06-04 PROCEDURE — 99207 ZZC PRENATAL VISIT: CPT | Performed by: OBSTETRICS & GYNECOLOGY

## 2018-06-04 NOTE — PROGRESS NOTES
Saw MC for her NOB and touched on  or repeat c/s and patient thought she wanted 4-5 kids so worried to do repeat c/s's. However had 2 hours pushing after 2 hours laboring down and arrest of descent and fetal tachy though no chorio. Head was impacted at c/s so RN had to push head up. Was OP but also felt to be CPD  Discussed all the pro/cons and r/b/a and patient will likely do repeat c/s. Based on dates would need  before clinic(wed)  Worried about weight gain. Still exercising regularly but really was either not hungry at all, really naseous or craving every carb in site. 5# in 4 weeks is reasonable. Discussed kcal counts, poss nutrition appointment if gains quickly but for now will just try to be careful  Discussed her normal Innatal. Discussed LII or just anatomy u/s here with us and would prefer to do the latter.   Return 4 weeks and offer AFP

## 2018-06-04 NOTE — MR AVS SNAPSHOT
After Visit Summary   6/4/2018    Sherie Young    MRN: [id number]           Patient Information     Date Of Birth          1985        Visit Information        Provider Department      6/4/2018 4:00 PM Debby Sen MD [facility] [place]        Today's Diagnoses     Supervision of high-risk pregnancy of elderly multigravida           Follow-ups after your visit        Your next 10 appointments already scheduled     Jul 02, 2018  4:10 PM CDT   ESTABLISHED PRENATAL with Debby Sen MD   [facility] [place] ([facility] [place])    [address] [place] [address] [address]  [facility] [address]   [phone]            Jul 16, 2018  8:00 AM CDT   Genetic Counseling with SH GEN COUNSELOR 2   [facility] Maternal Fetal Medicine [facility])    [address] [place] [id number]   [phone]            Jul 16, 2018  8:45 AM CDT   MFM US COMP with [email or web address]   [facility] Maternal Fetal Medicine Ultrasound - [facility])    [address] [place] [id number]   [phone]           Wear comfortable clothes and leave your valuables at home.            Jul 16, 2018  9:15 AM CDT   Radiology MD with JIMMY MONTALVO MD   [facility] Maternal Fetal Medicine [facility])    [address] [place] [id number]   [phone]           Please arrive at the time given for your first appointment. This visit is used internally to schedule the physician's time during your ultrasound.            Jul 25, 2018  3:30 PM CDT   US PELVIC COMPLETE W TRANSVAGINAL with [email or web address]   [facility] [place] ([facility] [place])    [address] [facility] [address]  [facility] [address]   [phone]           Please bring a list of your medicines (including vitamins, minerals and over-the-counter drugs). Also, tell your doctor about any  allergies you may have. Wear comfortable clothes and leave your valuables at home.  Adults: Drink six 8-ounce glasses of fluid one hour before your exam. Do NOT empty your bladder.  If you need to empty your bladder before your exam, try to release only a little bit of urine. Then, drink another 8oz glass of fluid.  Children: Children who are potty trained should drink at least 4 cups (32 oz) of liquid 45 minutes to one hour prior to the exam. The child s bladder must be full in order to achieve a diagnostic exam. If your child is very uncomfortable or has an urgent need to pee, please notify a technologist; they will try to find out how much longer the wait may be and provide instructions to help relieve the pressure. Occasionally it is medically necessary to insert a urinary catheter to fill the bladder.  Please call the Imaging Department at your exam site with any questions.            Jul 25, 2018  4:10 PM CDT   ESTABLISHED PRENATAL with Debby Sen MD   [facility] [place] ([facility])    [address] [place] [id number] [phone]              Who to contact     If you have questions or need follow up information about today's clinic visit or your schedule please contact [facility] directly at [phone].  Normal or non-critical lab and imaging results will be communicated to you by MyChart, letter or phone within 4 business days after the clinic has received the results. If you do not hear from us within 7 days, please contact the clinic through MyChart or phone. If you have a critical or abnormal lab result, we will notify you by phone as soon as possible.  Submit refill requests through RFMarq or call your pharmacy and they will forward the refill request to us. Please allow 3 business days for your refill to be completed.          Additional Information About Your Visit        Cartera Commercehart Information     RFMarq lets you  "send messages to your doctor, view your test results, renew your prescriptions, schedule appointments and more. To sign up, go to [email or web address] . Click on \"Log in\" on the left side of the screen, which will take you to the Welcome page. Then click on \"Sign up Now\" on the right side of the page.     You will be asked to enter the access code listed below, as well as some personal information. Please follow the directions to create your username and password.     Your access code is: [email or web address]  Expires: 2018  9:34 AM     Your access code will  in 90 days. If you need help or a new code, please call your [place] clinic or [phone].        Care EveryWhere ID     This is your Care EveryWhere ID. This could be used by other organizations to access your [place] medical records  [id number]        Your Vitals Were     Last Period BMI (Body Mass Index)                2018 (Exact Date) 29.43 kg/m2           Blood Pressure from Last 3 Encounters:   18 110/70   18 120/74   18 112/64    Weight from Last 3 Encounters:   18 190 lb 6.4 oz (86.4 kg)   18 185 lb 12.8 oz (84.3 kg)   18 183 lb (83 kg)              Today, you had the following     No orders found for display       Primary Care Provider Office Phone # Fax #    Debby Sen [phone] [address] [address]        Equal Access to Services     [facility]: Hadii devora mao hadasholivia Soabigail, waaxda luqadaha, qaybta kaalmada leatha, inez shah . So [facility] [phone].    ATENCIÓN: Si habla español, tiene a reyna disposición servicios gratuitos de asistencia lingüística. Llame al [phone].    We comply with applicable federal civil rights laws and [place] laws. We do not discriminate on the basis of race, color, national origin, age, disability, sex, sexual orientation, or gender identity.            Thank you!     Thank you for " choosing [facility] [place]  for your care. Our goal is always to provide you with excellent care. Hearing back from our patients is one way we can continue to improve our services. Please take a few minutes to complete the written survey that you may receive in the mail after your visit with us. Thank you!             Your Updated Medication List - Protect others around you: Learn how to safely use, store and throw away your medicines at www.disposemymeds.org.          This list is accurate as of 6/4/18  4:56 PM.  Always use your most recent med list.                   Brand Name Dispense Instructions for use Diagnosis    Albuterol Sulfate 108 (90 Base) MCG/ACT Aepb           Prenatal Vitamins 28-0.8 MG Tabs      Take 1 tablet by mouth daily        TYLENOL PO      Take 650 mg by mouth every 4 hours as needed for mild pain or fever (migraines)    Supervision of high-risk pregnancy of elderly multigravida, first trimester       valACYclovir 1000 mg tablet    VALTREX    12 tablet    TAKE 2 TABLETS BY MOUTH TWICE DAILY FOR 1 DAYS    Cold sore

## 2018-06-28 ENCOUNTER — TELEPHONE (OUTPATIENT)
Dept: NURSING | Facility: CLINIC | Age: 33
End: 2018-06-28

## 2018-06-28 NOTE — TELEPHONE ENCOUNTER
Pt calling in, , 16+1  Wonders if it is okay to get her hair highlighted. Reviewed that it is safe in pregnancy per our prenatal education. We talked about going to salon early in the AM before fumes and other chemicals are in air and to limit time inside salon. Pt reports she was also going to check to see if her haircare provider can use the least toxic options for products. Pt verbalized understanding to all of the above and had no other questions at this time.

## 2018-07-02 ENCOUNTER — PRENATAL OFFICE VISIT (OUTPATIENT)
Dept: MIDWIFE SERVICES | Facility: CLINIC | Age: 33
End: 2018-07-02
Payer: COMMERCIAL

## 2018-07-02 DIAGNOSIS — O09.529 SUPERVISION OF HIGH-RISK PREGNANCY OF ELDERLY MULTIGRAVIDA: ICD-10-CM

## 2018-07-02 DIAGNOSIS — Z3A.16 16 WEEKS GESTATION OF PREGNANCY: ICD-10-CM

## 2018-07-02 DIAGNOSIS — Z36.1 NEED FOR MATERNAL SERUM ALPHA-PROTEIN (MSAFP) SCREENING: Primary | ICD-10-CM

## 2018-07-02 PROCEDURE — 99207 ZZC PRENATAL VISIT: CPT | Performed by: ADVANCED PRACTICE MIDWIFE

## 2018-07-02 PROCEDURE — 99000 SPECIMEN HANDLING OFFICE-LAB: CPT | Performed by: OBSTETRICS & GYNECOLOGY

## 2018-07-02 PROCEDURE — 82105 ALPHA-FETOPROTEIN SERUM: CPT | Mod: 90 | Performed by: OBSTETRICS & GYNECOLOGY

## 2018-07-02 PROCEDURE — 36415 COLL VENOUS BLD VENIPUNCTURE: CPT | Performed by: OBSTETRICS & GYNECOLOGY

## 2018-07-02 NOTE — PROGRESS NOTES
"Feels well, no longer nauseated  Has some \"bug bites\" under R bra line, and in each groin on underwear line. Reports  and child had same \"bites\" in the same place. States getting better, but unsure if it is a concern. Advised to continuing monitoring. If worse, can be seen for further evaluation. On exam, \"bites\" look like mosquito bites, redness and some scabbing noted. Does not appear infected.  Fetal movement Yes, Thinks she felt a \"flutter.\"  Denies loss of fluid/vb/contractions/pelvic pain  Round ligament discomfort discussed  Offered AFP: Unsure, will decide by next appt.  Anatomy ultrasound next visit between 18-22 weeks. Has scheduled already    Return to clinic 4 weeks    Romana ONOFRE CNM            "

## 2018-07-02 NOTE — MR AVS SNAPSHOT
After Visit Summary   7/2/2018    Sherie Young    MRN: [id number]           Patient Information     Date Of Birth          1985        Visit Information        Provider Department      7/2/2018 4:00 PM Romana Floyd APRN CN[facility]        Today's Diagnoses     Supervision of high-risk pregnancy of elderly multigravida          Care Instructions    Round Ligament Pain    Pregnancy can entail many normal discomforts. One of those discomforts may be round ligament pain. Round ligament pain occurs as your uterus and your baby grow and the muscles begin to stretch more in your abdomen. Round ligament pain is normal and not dangerous but can be very uncomfortable      Round ligament pain is typically described as an unpleasant sensation that ranges from a sharp knifelike pain to dull intermittent pain in the lower abdominal/suprapubic area of a pregnant woman      Virtually all pregnant women will experience this pain at some point during their pregnancies      It typically manifests between 16 and 20 weeks of pregnancy and can be incredibly bothersome especially for women who remain very active during their pregnancies       Please discuss with your midwife if you are having this type of pain; sometimes the pain can be associated with other medical conditions so it is important for us to assess you just to make sure    Comfort measures    There are certain things you can do to cope with round ligament pain and ease the discomfort you are having      Pregnancy support belt      Tylenol      Hot/ice packs      Baths       Exercise such as yoga and swimming that help stretch muscles      Prenatal massage      Reflexology to waist and pelvic joints       Positioning such as side-lying and hands and knees, make sure your abdomen is  well supported with pillows while doing different positions            Follow-ups after your visit        Follow-up notes from your  care team     Return in about 4 weeks (around 7/30/2018) for Prenatal Visit.      Your next 10 appointments already scheduled     Jul 25, 2018  3:30 PM CDT   US PELVIC COMPLETE W TRANSVAGINAL with WEUS1   [facility] [place] [facility] [place]    [address]  [place] [id number] [phone]           Please bring a list of your medicines (including vitamins, minerals and over-the-counter drugs). Also, tell your doctor about any allergies you may have. Wear comfortable clothes and leave your valuables at home.  Adults: Drink six 8-ounce glasses of fluid one hour before your exam. Do NOT empty your bladder.  If you need to empty your bladder before your exam, try to release only a little bit of urine. Then, drink another 8oz glass of fluid.  Children: Children who are potty trained should drink at least 4 cups (32 oz) of liquid 45 minutes to one hour prior to the exam. The child s bladder must be full in order to achieve a diagnostic exam. If your child is very uncomfortable or has an urgent need to pee, please notify a technologist; they will try to find out how much longer the wait may be and provide instructions to help relieve the pressure. Occasionally it is medically necessary to insert a urinary catheter to fill the bladder.  Please call the Imaging Department at your exam site with any questions.            Jul 25, 2018  4:10 PM CDT   ESTABLISHED PRENATAL with Debby Sen MD   [facility] [place] ([facility] [place])    [address] [address]  [place] [address] [phone]              Who to contact     If you have questions or need follow up information about today's clinic visit or your schedule please contact [facility] [place] directly at [phone].  Normal or non-critical lab and imaging results will be communicated to you by MyChart, letter or phone within 4 business days after the clinic  has received the results. If you do not hear from us within 7 days, please contact the clinic through [employer] or phone. If you have a critical or abnormal lab result, we will notify you by phone as soon as possible.  Submit refill requests through [employer] or call your pharmacy and they will forward the refill request to us. Please allow 3 business days for your refill to be completed.          Additional Information About Your Visit        Care EveryWhere ID     This is your Care EveryWhere ID. This could be used by other organizations to access your [place] medical records  [id number]        Your Vitals Were     Last Period                   03/07/2018 (Exact Date)            Blood Pressure from Last 3 Encounters:   06/04/18 110/70   05/08/18 120/74   02/26/18 112/64    Weight from Last 3 Encounters:   06/04/18 190 lb 6.4 oz (86.4 kg)   05/08/18 185 lb 12.8 oz (84.3 kg)   02/26/18 183 lb (83 kg)              Today, you had the following     No orders found for display       Primary Care Provider Office Phone # Fax #    Debby Sen [phone] [address]  [facility] [address]        Equal Access to Services     KERRY CAMPBELL AH: Hadii devora ponceo Soabigail, waaxda lumarcia, qaybta kaalmada adetrueda, inez santiago. So [facility] [phone].    ATENCIÓN: Si habla español, tiene a reyna disposición servicios gratuitos de asistencia lingüística. Sabraame al [phone].    We comply with applicable federal civil rights laws and [place] laws. We do not discriminate on the basis of race, color, national origin, age, disability, sex, sexual orientation, or gender identity.            Thank you!     Thank you for choosing [facility] [place]  for your care. Our goal is always to provide you with excellent care. Hearing back from our patients is one way we can continue to improve our services. Please take a few minutes to complete the written survey that  you may receive in the mail after your visit with us. Thank you!             Your Updated Medication List - Protect others around you: Learn how to safely use, store and throw away your medicines at www.disposemymeds.org.          This list is accurate as of 7/2/18  5:09 PM.  Always use your most recent med list.                   Brand Name Dispense Instructions for use Diagnosis    Albuterol Sulfate 108 (90 Base) MCG/ACT Aepb           Prenatal Vitamins 28-0.8 MG Tabs      Take 1 tablet by mouth daily        TYLENOL PO      Take 650 mg by mouth every 4 hours as needed for mild pain or fever (migraines)    Supervision of high-risk pregnancy of elderly multigravida, first trimester       valACYclovir 1000 mg tablet    VALTREX    12 tablet    TAKE 2 TABLETS BY MOUTH TWICE DAILY FOR 1 DAYS    Cold sore

## 2018-07-05 LAB
# FETUSES US: NORMAL
# FETUSES: 1
AFP ADJ MOM AMN: 0.65
AFP SERPL-MCNC: 20 NG/ML
AGE - REPORTED: 35.8 YR
CURRENT SMOKER: NO
CURRENT SMOKER: NO
DIABETES STATUS PATIENT: NO
FAMILY MEMBER DISEASES HX: NO
FAMILY MEMBER DISEASES HX: NO
GA METHOD: NORMAL
GA METHOD: NORMAL
GA: NORMAL WK
IDDM PATIENT QL: NO
INTEGRATED SCN PATIENT-IMP: NORMAL
LMP START DATE: NORMAL
MONOCHORIONIC TWINS: NO
SERVICE CMNT-IMP: NO
SPECIMEN DRAWN SERPL: NORMAL
VALPROIC/CARBAMAZEPINE STATUS: NO
WEIGHT UNITS: NORMAL

## 2018-07-25 ENCOUNTER — RADIANT APPOINTMENT (OUTPATIENT)
Dept: ULTRASOUND IMAGING | Facility: CLINIC | Age: 33
End: 2018-07-25
Payer: COMMERCIAL

## 2018-07-25 ENCOUNTER — PRENATAL OFFICE VISIT (OUTPATIENT)
Dept: OBGYN | Facility: CLINIC | Age: 33
End: 2018-07-25
Payer: COMMERCIAL

## 2018-07-25 VITALS — BODY MASS INDEX: 30.85 KG/M2 | WEIGHT: 199.6 LBS | SYSTOLIC BLOOD PRESSURE: 100 MMHG | DIASTOLIC BLOOD PRESSURE: 60 MMHG

## 2018-07-25 DIAGNOSIS — O09.529 SUPERVISION OF HIGH-RISK PREGNANCY OF ELDERLY MULTIGRAVIDA: Primary | ICD-10-CM

## 2018-07-25 DIAGNOSIS — O34.219 PREVIOUS CESAREAN SECTION COMPLICATING PREGNANCY: ICD-10-CM

## 2018-07-25 DIAGNOSIS — Z36.89 ENCOUNTER FOR FETAL ANATOMIC SURVEY: ICD-10-CM

## 2018-07-25 PROCEDURE — 76805 OB US >/= 14 WKS SNGL FETUS: CPT | Performed by: OBSTETRICS & GYNECOLOGY

## 2018-07-25 PROCEDURE — 99207 ZZC PRENATAL VISIT: CPT | Performed by: OBSTETRICS & GYNECOLOGY

## 2018-07-25 NOTE — MR AVS SNAPSHOT
After Visit Summary   7/25/2018    Sherie Young    MRN: [id number]           Patient Information     Date Of Birth          1985        Visit Information        Provider Department      7/25/2018 4:10 PM Debby Sen MD [facility] [place]        Today's Diagnoses     Supervision of high-risk pregnancy of elderly multigravida           Follow-ups after your visit        Your next 10 appointments already scheduled     Aug 15, 2018  4:00 PM CDT   ESTABLISHED PRENATAL with Debby Sen MD   [facility] [place] ([facility])    [address] [place] [address]   [phone]              Who to contact     If you have questions or need follow up information about today's clinic visit or your schedule please contact [facility] [place] directly at [phone].  Normal or non-critical lab and imaging results will be communicated to you by MyChart, letter or phone within 4 business days after the clinic has received the results. If you do not hear from us within 7 days, please contact the clinic through MyChart or phone. If you have a critical or abnormal lab result, we will notify you by phone as soon as possible.  Submit refill requests through [employer] or call your pharmacy and they will forward the refill request to us. Please allow 3 business days for your refill to be completed.          Additional Information About Your Visit        Care EveryWhere ID     This is your Care EveryWhere ID. This could be used by other organizations to access your [place] medical records  [id number]        Your Vitals Were     Last Period BMI (Body Mass Index)                03/07/2018 (Exact Date) 30.85 kg/m2           Blood Pressure from Last 3 Encounters:   07/25/18 100/60   06/04/18 110/70   05/08/18 120/74    Weight from Last 3 Encounters:   07/25/18 199 lb 9.6 oz (90.5 kg)   06/04/18 190 lb 6.4 oz (86.4 kg)    05/08/18 185 lb 12.8 oz (84.3 kg)              Today, you had the following     No orders found for display       Primary Care Provider Office Phone # Fax #    Debby Sen [phone] [address] [place] [address]        Equal Access to Services     [facility] : Hadii aad ku hadasho Soomaali, waaxda luqadaha, qaybta kaalmada adeegyada, waxay nitoin hayaan adeemma ambernehal shah . So [facility] [phone].    ATENCIÓN: Si habla español, tiene a reyna disposición servicios gratuitos de asistencia lingüística. Sabraame al [phone].    We comply with applicable federal civil rights laws and [place] laws. We do not discriminate on the basis of race, color, national origin, age, disability, sex, sexual orientation, or gender identity.            Thank you!     Thank you for choosing [facility] [place]  for your care. Our goal is always to provide you with excellent care. Hearing back from our patients is one way we can continue to improve our services. Please take a few minutes to complete the written survey that you may receive in the mail after your visit with us. Thank you!             Your Updated Medication List - Protect others around you: Learn how to safely use, store and throw away your medicines at www.disposemymeds.org.          This list is accurate as of 7/25/18  5:03 PM.  Always use your most recent med list.                   Brand Name Dispense Instructions for use Diagnosis    albuterol 108 (90 Base) MCG/ACT Aepb inhaler    PROAIR RESPICLICK          Prenatal Vitamins 28-0.8 MG Tabs      Take 1 tablet by mouth daily        TYLENOL PO      Take 650 mg by mouth every 4 hours as needed for mild pain or fever (migraines)    Supervision of high-risk pregnancy of elderly multigravida, first trimester       valACYclovir 1000 mg tablet    VALTREX    12 tablet    TAKE 2 TABLETS BY MOUTH TWICE DAILY FOR 1 DAYS    Cold sore

## 2018-07-29 NOTE — PROGRESS NOTES
Normal anatomy us so really reassured. Placenta is posterior which is reassuring for risk of accreta in prior c/s patient and this was discussed with her  Patient would like to move forward with scheduling a repeat c/s after considering options. She just doesn't want to take the 1% rupture risk  Did  success calculator and she is at 47% rate so this is reassuring to her that making correct decision  Will do either  or , whichever has a 7am available. If they both do then will just do  b/c that's exactly 39+0  No concerns  Good FM, no cramping or ctx  Return 4 weeks

## 2018-07-30 ENCOUNTER — TELEPHONE (OUTPATIENT)
Dept: OBGYN | Facility: CLINIC | Age: 33
End: 2018-07-30

## 2018-07-30 NOTE — TELEPHONE ENCOUNTER
Type of surgery: RPT CS  Location of surgery: [facility]  Date and time of surgery: 12/7/2018 7am ARRIVAL 5am  Surgeon: Mckinley Matson  Pre-Op Appt Date: TBD  Post-Op Appt Date: TBD   Packet sent out: MAILED 7/30/2018  Pre-cert/Authorization completed:  TBD  Date: 7/30/2018 Eliel haney/Chelle Padilla  Surgery Scheduler

## 2018-07-30 NOTE — TELEPHONE ENCOUNTER
Order Questions      Question Answer Comment     Procedure name(s) - multi select Repeat  Delivery      Reason for procedure prior c/s requesting repeat      Is this a multi surgeon case? No      Laterality N/A      Request for additional equipment Other (see comments) None     Anesthesia Spinal      Positioning (if different from preference card): Supine      Initiate Pre-op orders for above procedure: Yes, as ordered in Epic additional orders noted there also     Location of Case: [place] OR      Surgical Assistant none      Operating room  requested: No      Urgency of Surgery: Routine      Surgeon Procedure Time (incision to closure) in minutes (per procedure as applicable) 90      Note:  Surgical Case Time Needed (in minutes)     Surgery Date: 39-40 weeks  or  need 7am. if both 7am are open then / o/w will take whichever has the 7am available     Time of Surgery (Requested): 7:00 AM      Patient Class (for admit prior to surgery, specify number of days in comments): Surgery admit admit day of surgery     Length of Stay: 3 days      H&P To Be Completed By: Surgeon      Where is the note? In EpicProess Note      Post-Op Appointment 6 weeks      Vendor Needed? No

## 2018-08-15 ENCOUNTER — PRENATAL OFFICE VISIT (OUTPATIENT)
Dept: OBGYN | Facility: CLINIC | Age: 33
End: 2018-08-15
Payer: COMMERCIAL

## 2018-08-15 VITALS — BODY MASS INDEX: 31.32 KG/M2 | DIASTOLIC BLOOD PRESSURE: 62 MMHG | SYSTOLIC BLOOD PRESSURE: 104 MMHG | WEIGHT: 202.6 LBS

## 2018-08-15 DIAGNOSIS — O34.219 PREVIOUS CESAREAN SECTION COMPLICATING PREGNANCY: ICD-10-CM

## 2018-08-15 DIAGNOSIS — O09.529 SUPERVISION OF HIGH-RISK PREGNANCY OF ELDERLY MULTIGRAVIDA: Primary | ICD-10-CM

## 2018-08-15 PROCEDURE — 99207 ZZC PRENATAL VISIT: CPT | Performed by: OBSTETRICS & GYNECOLOGY

## 2018-08-15 NOTE — MR AVS SNAPSHOT
After Visit Summary   8/15/2018    Sherie Young    MRN: [id number]           Patient Information     Date Of Birth          1985        Visit Information        Provider Department      8/15/2018 4:00 PM Debby Sen MD [facility] [place]        Today's Diagnoses     Supervision of high-risk pregnancy of elderly multigravida    -  1    Previous  section complicating pregnancy           Follow-ups after your visit        Your next 10 appointments already scheduled     Sep 14, 2018  9:30 AM CDT   Glucose Tolerance Test with WE LAB   [facility] [place] ([facility] [place])    [address]  [facility] [address]   [phone]            Sep 14, 2018  9:50 AM CDT   ESTABLISHED PRENATAL with Debby Sen MD   [facility] [place] ([facility] [place])    [address]  [place] [address] [phone]            Dec 07, 2018   Procedure with Debby Sen MD    Birthplace (--)    [address]  [place] [address] [phone]              Who to contact     If you have questions or need follow up information about today's clinic visit or your schedule please contact [facility] [place] directly at [phone].  Normal or non-critical lab and imaging results will be communicated to you by MyChart, letter or phone within 4 business days after the clinic has received the results. If you do not hear from us within 7 days, please contact the clinic through MyChart or phone. If you have a critical or abnormal lab result, we will notify you by phone as soon as possible.  Submit refill requests through [employer] or call your pharmacy and they will forward the refill request to us. Please allow 3 business days for your refill to be completed.          Additional Information About Your Visit        Care EveryWhere ID     This is your Care EveryWhere ID.  This could be used by other organizations to access your [place] medical records  [id number]        Your Vitals Were     Last Period BMI (Body Mass Index)                03/07/2018 (Exact Date) 31.32 kg/m2           Blood Pressure from Last 3 Encounters:   08/15/18 104/62   07/25/18 100/60   06/04/18 110/70    Weight from Last 3 Encounters:   08/15/18 202 lb 9.6 oz (91.9 kg)   07/25/18 199 lb 9.6 oz (90.5 kg)   06/04/18 190 lb 6.4 oz (86.4 kg)              Today, you had the following     No orders found for display       Primary Care Provider Office Phone # Fax #    Debby Sen [phone] [address] [address]        Equal Access to Services     LUCIA CAMPBELL : Hadii devora Real, waaxalfreda cota, qaybta kaalmaalfreda zhang, inez shah . So [facility] [phone].    ATENCIÓN: Si habla español, tiene a reyna disposición servicios gratuitos de asistencia lingüística. Nicanor al [phone].    We comply with applicable federal civil rights laws and [place] laws. We do not discriminate on the basis of race, color, national origin, age, disability, sex, sexual orientation, or gender identity.            Thank you!     Thank you for choosing [facility] [place]  for your care. Our goal is always to provide you with excellent care. Hearing back from our patients is one way we can continue to improve our services. Please take a few minutes to complete the written survey that you may receive in the mail after your visit with us. Thank you!             Your Updated Medication List - Protect others around you: Learn how to safely use, store and throw away your medicines at www.disposemymeds.org.          This list is accurate as of 8/15/18 11:59 PM.  Always use your most recent med list.                   Brand Name Dispense Instructions for use Diagnosis    Prenatal Vitamins 28-0.8 MG Tabs      Take 1 tablet by mouth daily         TYLENOL PO      Take 650 mg by mouth every 4 hours as needed for mild pain or fever (migraines)    Supervision of high-risk pregnancy of elderly multigravida, first trimester       valACYclovir 1000 mg tablet    VALTREX    12 tablet    TAKE 2 TABLETS BY MOUTH TWICE DAILY FOR 1 DAYS    Cold sore

## 2018-08-18 NOTE — PROGRESS NOTES
Feeling good. Happy that her weight gain was more appropriate this time b/c was really trying to gain less this pregnancy compared to her last one. Trying to eat healthy and walk most days  Good FM  Some lower pressure but no ctx or tightening  Patient's repeat c/s was scheduled for 12/7  Patient has a trip to [place] [facility] for work next week and then to [place] for her sister's bachelorette party in October. Discussed vte risk, compression hose, etc  Return 4 weeks with GCT and tdap and hgb and rhogam

## 2018-09-09 DIAGNOSIS — B00.1 COLD SORE: ICD-10-CM

## 2018-09-10 NOTE — TELEPHONE ENCOUNTER
"Requested Prescriptions   Pending Prescriptions Disp Refills     valACYclovir (VALTREX) 1000 mg tablet [Pharmacy Med Name: VALACYCLOVIR 1GM TABLETS] 12 tablet 0     Sig: TAKE 2 TABLETS BY MOUTH TWICE DAILY FOR 1 DAY    Antivirals for Herpes Protocol Failed    9/9/2018  3:59 PM       Failed - Normal serum creatinine on file in past 12 months    No lab results found.         Passed - Patient is age 12 or older       Passed - Recent (12 mo) or future (30 days) visit within the authorizing provider's specialty    Patient had office visit in the last 12 months or has a visit in the next 30 days with authorizing provider or within the authorizing provider's specialty.  See \"Patient Info\" tab in inbasket, or \"Choose Columns\" in Meds & Orders section of the refill encounter.            Last Written Prescription Date:  2/27/18  Last Fill Quantity: 12,  # refills: 0   Last office visit: 8/15/2018 with prescribing provider:  Cash   Future Office Visit:   Next 5 appointments (look out 90 days)     Sep 14, 2018  9:50 AM CDT   ESTABLISHED PRENATAL with Debby Sen MD   [facility] [place] ([facility] [place])    [address] [address] [phone]                   "

## 2018-09-11 ENCOUNTER — TELEPHONE (OUTPATIENT)
Dept: NURSING | Facility: CLINIC | Age: 33
End: 2018-09-11

## 2018-09-11 NOTE — TELEPHONE ENCOUNTER
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 Pt looking for varicella titer, rubella titer and Hep B surface antigen. Pt also wants to know when last MMR and TDaP were given. Pt asked about flu shot. Pt informed varicella titer is not on chart. Flu shots are available in office. Can send copy of rubella titer and hep B antigen to her email. Tdap was 7/6/16 and good for ten years. MMR is not listed on medical records. Would have to call peds. Pt will call her mother to see if she has copies of varicella and MMR.   Reviewed with Lorena in the lab and pt would need to come in today or Wednesday for varicella titer to make sure results are available for Friday. Left detailed message on pt's voicemail.

## 2018-09-12 DIAGNOSIS — Z01.84 IMMUNITY STATUS TESTING: Primary | ICD-10-CM

## 2018-09-12 PROCEDURE — 86787 VARICELLA-ZOSTER ANTIBODY: CPT | Performed by: OBSTETRICS & GYNECOLOGY

## 2018-09-12 PROCEDURE — 36415 COLL VENOUS BLD VENIPUNCTURE: CPT | Performed by: OBSTETRICS & GYNECOLOGY

## 2018-09-12 RX ORDER — VALACYCLOVIR HYDROCHLORIDE 1 G/1
TABLET, FILM COATED ORAL
Qty: 12 TABLET | Refills: 0 | Status: ON HOLD | OUTPATIENT
Start: 2018-09-12 | End: 2018-12-10

## 2018-09-12 NOTE — TELEPHONE ENCOUNTER
Routing refill request to provider for review/approval because:  Labs not current:  Creatinine    Adelaide Walsh RN

## 2018-09-13 LAB — VZV IGG SER QL IA: 0.4 AI (ref 0–0.8)

## 2018-09-14 ENCOUNTER — PRENATAL OFFICE VISIT (OUTPATIENT)
Dept: OBGYN | Facility: CLINIC | Age: 33
End: 2018-09-14
Payer: COMMERCIAL

## 2018-09-14 VITALS — BODY MASS INDEX: 32.15 KG/M2 | WEIGHT: 208 LBS | DIASTOLIC BLOOD PRESSURE: 60 MMHG | SYSTOLIC BLOOD PRESSURE: 108 MMHG

## 2018-09-14 DIAGNOSIS — O34.219 PREVIOUS CESAREAN SECTION COMPLICATING PREGNANCY: ICD-10-CM

## 2018-09-14 DIAGNOSIS — O09.529 SUPERVISION OF HIGH-RISK PREGNANCY OF ELDERLY MULTIGRAVIDA: ICD-10-CM

## 2018-09-14 DIAGNOSIS — Z23 NEED FOR PROPHYLACTIC VACCINATION AND INOCULATION AGAINST INFLUENZA: ICD-10-CM

## 2018-09-14 DIAGNOSIS — O09.529 SUPERVISION OF HIGH-RISK PREGNANCY OF ELDERLY MULTIGRAVIDA: Primary | ICD-10-CM

## 2018-09-14 DIAGNOSIS — Z23 NEED FOR TDAP VACCINATION: ICD-10-CM

## 2018-09-14 DIAGNOSIS — Z29.13 NEED FOR RHOGAM DUE TO RH NEGATIVE MOTHER: ICD-10-CM

## 2018-09-14 DIAGNOSIS — Z36.9 ENCOUNTER FOR ANTENATAL SCREENING OF MOTHER: Primary | ICD-10-CM

## 2018-09-14 LAB
BLD GP AB SCN SERPL QL: NORMAL
GLUCOSE 1H P 50 G GLC PO SERPL-MCNC: 88 MG/DL (ref 60–129)
HGB BLD-MCNC: 11.1 G/DL (ref 11.7–15.7)

## 2018-09-14 PROCEDURE — 00000218 ZZHCL STATISTIC OBHBG - HEMOGLOBIN: Performed by: OBSTETRICS & GYNECOLOGY

## 2018-09-14 PROCEDURE — 86850 RBC ANTIBODY SCREEN: CPT | Performed by: OBSTETRICS & GYNECOLOGY

## 2018-09-14 PROCEDURE — 90472 IMMUNIZATION ADMIN EACH ADD: CPT

## 2018-09-14 PROCEDURE — 90686 IIV4 VACC NO PRSV 0.5 ML IM: CPT

## 2018-09-14 PROCEDURE — 36415 COLL VENOUS BLD VENIPUNCTURE: CPT | Performed by: OBSTETRICS & GYNECOLOGY

## 2018-09-14 PROCEDURE — 96372 THER/PROPH/DIAG INJ SC/IM: CPT

## 2018-09-14 PROCEDURE — 90715 TDAP VACCINE 7 YRS/> IM: CPT

## 2018-09-14 PROCEDURE — 90471 IMMUNIZATION ADMIN: CPT

## 2018-09-14 PROCEDURE — 82950 GLUCOSE TEST: CPT | Performed by: OBSTETRICS & GYNECOLOGY

## 2018-09-14 PROCEDURE — 99207 ZZC PRENATAL VISIT: CPT | Performed by: OBSTETRICS & GYNECOLOGY

## 2018-09-14 NOTE — MR AVS SNAPSHOT
After Visit Summary   2018    Sherie Young    MRN: [id number]           Patient Information     Date Of Birth          1985        Visit Information        Provider Department      2018 9:50 AM Debby Sen MD [facility] [place]        Today's Diagnoses     Supervision of high-risk pregnancy of elderly multigravida    -  1    Previous  section complicating pregnancy           Follow-ups after your visit        Your next 10 appointments already scheduled     Sep 26, 2018  8:50 AM CDT   ESTABLISHED PRENATAL with Debby Sen MD   [facility] [place] ([facility] [place])    [address] [place] [address] [address]  [place] [address]   [phone]            Oct 08, 2018  9:20 AM CDT   ESTABLISHED PRENATAL with eDbby Sen MD   [facility] [place] ([facility] [place])    [address] [place] [address] [address]  [place] [address]   [phone]            Oct 24, 2018  8:30 AM CDT   ESTABLISHED PRENATAL with Debby Sen MD   [facility] [place] ([facility] [place])    [address] [place] [address] 100  [place] [address]   [phone]            2018  8:50 AM CST   ESTABLISHED PRENATAL with Debby Sen MD   [facility] [place] ([facility] [place])    [address] [place] [address] 100  [place] [address]   [phone]            Dec 07, 2018   Procedure with Debby Sen MD    Birthplace (--)    [address]  [place] [address]   [phone]              Who to contact     If you have questions or need follow up information about today's clinic visit or your schedule please contact [facility] [place] directly at [phone].  Normal or non-critical lab and imaging results will be communicated to you by MyChart, letter or phone within 4 business days after the clinic has received the results. If you do not  hear from us within 7 days, please contact the clinic through [employer] or phone. If you have a critical or abnormal lab result, we will notify you by phone as soon as possible.  Submit refill requests through [employer] or call your pharmacy and they will forward the refill request to us. Please allow 3 business days for your refill to be completed.          Additional Information About Your Visit        [employer] Information     [employer] gives you secure access to your electronic health record. If you see a primary care provider, you can also send messages to your care team and make appointments. If you have questions, please call your primary care clinic.  If you do not have a primary care provider, please call [phone] and they will assist you.        Care EveryWhere ID     This is your Care EveryWhere ID. This could be used by other organizations to access your [place] medical records  [id number]        Your Vitals Were     Last Period BMI (Body Mass Index)                03/07/2018 (Exact Date) 32.15 kg/m2           Blood Pressure from Last 3 Encounters:   09/14/18 108/60   08/15/18 104/62   07/25/18 100/60    Weight from Last 3 Encounters:   09/14/18 208 lb (94.3 kg)   08/15/18 202 lb 9.6 oz (91.9 kg)   07/25/18 199 lb 9.6 oz (90.5 kg)              Today, you had the following     No orders found for display       Primary Care Provider Office Phone # Fax #    Debby Sen [phone] [address]  [place] [address]        Equal Access to Services     LUCIA CAMPBELL AH: Hadii aad ku hadasho Soomaali, waaxda luqadaha, qaybta kaalmada adeegyada, inez santiago. So [facility] [phone].    ATENCIÓN: Si habla español, tiene a reyna disposición servicios gratuitos de asistencia lingüística. Llame al [phone].    We comply with applicable federal civil rights laws and [place] laws. We do not discriminate on the basis of race, color, national origin, age,  disability, sex, sexual orientation, or gender identity.            Thank you!     Thank you for choosing [facility] [place]  for your care. Our goal is always to provide you with excellent care. Hearing back from our patients is one way we can continue to improve our services. Please take a few minutes to complete the written survey that you may receive in the mail after your visit with us. Thank you!             Your Updated Medication List - Protect others around you: Learn how to safely use, store and throw away your medicines at www.disposemymeds.org.          This list is accurate as of 9/14/18 12:47 PM.  Always use your most recent med list.                   Brand Name Dispense Instructions for use Diagnosis    Prenatal Vitamins 28-0.8 MG Tabs      Take 1 tablet by mouth daily        TYLENOL PO      Take 650 mg by mouth every 4 hours as needed for mild pain or fever (migraines)    Supervision of high-risk pregnancy of elderly multigravida, first trimester       valACYclovir 1000 mg tablet    VALTREX    12 tablet    TAKE 2 TABLETS BY MOUTH TWICE DAILY FOR 1 DAY    Cold sore

## 2018-09-14 NOTE — PROGRESS NOTES

## 2018-09-14 NOTE — PROGRESS NOTES
Upset that the weight gain was a little fast again this time. 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 Patient is almost sure she had chicken pox in 5th grade so was surprised. Can't vaccinate now so will have to find out if she can go to her training or not and then can do vaccine after delivery  Flu and tdap today  Rhogam today as well and doing GCT/hgb as well  FH is right on track, good FM, some BH but nothing signficant  Return 2 weeks.

## 2018-09-14 NOTE — PROGRESS NOTES
Syphilis is a sexually transmitted disease that can cause birth defects in the babies of untreated mothers. Every pregnant patient is tested for syphilis early in each pregnancy as part of the routine lab work. The [place] Department of [facility] has seen an increase in the rate of syphilis in [place]. The [facility] now recommends testing for syphilis 3 times during a pregnancy, the new prenatal visit, 28 weeks and when admitted for delivery. Patient declines lab testing for syphilis.

## 2018-09-26 ENCOUNTER — PRENATAL OFFICE VISIT (OUTPATIENT)
Dept: OBGYN | Facility: CLINIC | Age: 33
End: 2018-09-26
Payer: COMMERCIAL

## 2018-09-26 VITALS — DIASTOLIC BLOOD PRESSURE: 76 MMHG | BODY MASS INDEX: 32.15 KG/M2 | SYSTOLIC BLOOD PRESSURE: 110 MMHG | WEIGHT: 208 LBS

## 2018-09-26 DIAGNOSIS — O34.219 PREVIOUS CESAREAN SECTION COMPLICATING PREGNANCY: ICD-10-CM

## 2018-09-26 DIAGNOSIS — O09.529 SUPERVISION OF HIGH-RISK PREGNANCY OF ELDERLY MULTIGRAVIDA: Primary | ICD-10-CM

## 2018-09-26 PROCEDURE — 99207 ZZC PRENATAL VISIT: CPT | Performed by: OBSTETRICS & GYNECOLOGY

## 2018-09-26 NOTE — PROGRESS NOTES
Happy that didn't gain weight this time after larger jump last time  Doing spin class but starting to hurt so thinks she'll change to something different  Tons of FM the last 2 days but had a quieter two days after laying on back for a facial. Reassured that now that FM is normal it's not a concern  Feels like carrying really low. kegels are harder to hold but overall feeling fine and no ctx  FH is essentially on track  Discussed if labor or SROM should occur before c/s that we'd just do the c/s early  Return 2 weeks.

## 2018-09-26 NOTE — MR AVS SNAPSHOT
After Visit Summary   2018    Sherie Young    MRN: [id number]           Patient Information     Date Of Birth          1985        Visit Information        Provider Department      2018 8:50 AM Debby Sen MD [facility] [place]        Today's Diagnoses     Supervision of high-risk pregnancy of elderly multigravida    -  1    Previous  section complicating pregnancy           Follow-ups after your visit        Your next 10 appointments already scheduled     Oct 08, 2018  9:20 AM CDT   ESTABLISHED PRENATAL with Debby Sen MD   [facility] [place] ([facility] [place])    [address]  [place] [address]   [phone]            Oct 24, 2018  8:30 AM CDT   ESTABLISHED PRENATAL with Debby Sen MD   [facility] [place] ([facility] [place])    [address]  [place] [address]   [phone]            2018  8:50 AM CST   ESTABLISHED PRENATAL with Debby Sen MD   [facility] [place] ([facility] [place])    [address]  [place] [address]   [phone]            Dec 07, 2018   Procedure with Debby Sen MD    Birthplace (--)    [address]  [place] [address] [phone]              Who to contact     If you have questions or need follow up information about today's clinic visit or your schedule please contact [facility] [place] directly at [phone].  Normal or non-critical lab and imaging results will be communicated to you by MyChart, letter or phone within 4 business days after the clinic has received the results. If you do not hear from us within 7 days, please contact the clinic through MyChart or phone. If you have a critical or abnormal lab result, we will notify you by phone as soon as possible.  Submit refill requests through [employer] or call  your pharmacy and they will forward the refill request to us. Please allow 3 business days for your refill to be completed.          Additional Information About Your Visit        MyChart Information     [employer] gives you secure access to your electronic health record. If you see a primary care provider, you can also send messages to your care team and make appointments. If you have questions, please call your primary care clinic.  If you do not have a primary care provider, please call [phone] and they will assist you.        Care EveryWhere ID     This is your Care EveryWhere ID. This could be used by other organizations to access your [place] medical records  [id number]        Your Vitals Were     Last Period BMI (Body Mass Index)                03/07/2018 (Exact Date) 32.15 kg/m2           Blood Pressure from Last 3 Encounters:   09/26/18 110/76   09/14/18 108/60   08/15/18 104/62    Weight from Last 3 Encounters:   09/26/18 208 lb (94.3 kg)   09/14/18 208 lb (94.3 kg)   08/15/18 202 lb 9.6 oz (91.9 kg)              Today, you had the following     No orders found for display       Primary Care Provider Office Phone # Fax #    Debby Sen [phone] [address] [address]        Equal Access to Services     LUCIA CAMPBELL : Hadii aad ku hadasho Soomaali, waaxda luqadaha, qaybta kaalmada adeegyada, inez morales haymurtaza shah . So [facility] [phone].    ATENCIÓN: Si habla español, tiene a reyna disposición servicios gratuitos de asistencia lingüística. Llame al [phone].    We comply with applicable federal civil rights laws and [place] laws. We do not discriminate on the basis of race, color, national origin, age, disability, sex, sexual orientation, or gender identity.            Thank you!     Thank you for choosing [facility] [place]  for your care. Our goal is always to provide you with excellent care. Hearing back from our  patients is one way we can continue to improve our services. Please take a few minutes to complete the written survey that you may receive in the mail after your visit with us. Thank you!             Your Updated Medication List - Protect others around you: Learn how to safely use, store and throw away your medicines at www.disposemymeds.org.          This list is accurate as of 9/26/18  9:14 AM.  Always use your most recent med list.                   Brand Name Dispense Instructions for use Diagnosis    Prenatal Vitamins 28-0.8 MG Tabs      Take 1 tablet by mouth daily        TYLENOL PO      Take 650 mg by mouth every 4 hours as needed for mild pain or fever (migraines)    Supervision of high-risk pregnancy of elderly multigravida, first trimester       valACYclovir 1000 mg tablet    VALTREX    12 tablet    TAKE 2 TABLETS BY MOUTH TWICE DAILY FOR 1 DAY    Cold sore

## 2018-10-08 ENCOUNTER — PRENATAL OFFICE VISIT (OUTPATIENT)
Dept: OBGYN | Facility: CLINIC | Age: 33
End: 2018-10-08
Payer: COMMERCIAL

## 2018-10-08 VITALS — WEIGHT: 210 LBS | BODY MASS INDEX: 32.46 KG/M2 | SYSTOLIC BLOOD PRESSURE: 110 MMHG | DIASTOLIC BLOOD PRESSURE: 60 MMHG

## 2018-10-08 DIAGNOSIS — O34.219 PREVIOUS CESAREAN SECTION COMPLICATING PREGNANCY: ICD-10-CM

## 2018-10-08 DIAGNOSIS — O09.529 SUPERVISION OF HIGH-RISK PREGNANCY OF ELDERLY MULTIGRAVIDA: Primary | ICD-10-CM

## 2018-10-08 DIAGNOSIS — O26.843 UTERINE SIZE-DATE DISCREPANCY IN THIRD TRIMESTER: ICD-10-CM

## 2018-10-08 PROCEDURE — 99207 ZZC PRENATAL VISIT: CPT | Performed by: OBSTETRICS & GYNECOLOGY

## 2018-10-08 NOTE — MR AVS SNAPSHOT
After Visit Summary   10/8/2018    Sherie Young    MRN: [id number]           Patient Information     Date Of Birth          1985        Visit Information        Provider Department      10/8/2018 9:20 AM Debby Sen MD [facility] [place]        Today's Diagnoses     Supervision of high-risk pregnancy of elderly multigravida    -  1    Previous  section complicating pregnancy        Uterine size-date discrepancy in third trimester           Follow-ups after your visit        Your next 10 appointments already scheduled     Oct 24, 2018  8:30 AM CDT   ESTABLISHED PRENATAL with Debby Sen MD   [facility] [place] ([facility] [place])    [address] [place] [address] [address]  [place] [address]   [phone]            2018  8:50 AM CST   ESTABLISHED PRENATAL with Debby Sen MD   [facility] [place] ([facility] [place])    [address] [place] [address] [address]  [place] [address]   [phone]            2018  8:50 AM CST   ESTABLISHED PRENATAL with Debby Sen MD   [facility] [place] ([facility] [place])    [address] [place] [address] [address]  [place] [address]   [phone]            2018  9:40 AM CST   ESTABLISHED PRENATAL with Debby Sen MD   [facility] [place] ([facility] [place])    [address] [place] [address] [address]  [place] [address]   [phone]            2018  8:50 AM CST   ESTABLISHED PRENATAL with Debby Sen MD   [facility] [place] ([facility] [place])    [address] [place] [address] [address]  [place] [address]   [phone]            Dec 07, 2018   Procedure with Debby Sen MD    Birthplace (--)    [address]  [place] [address] [phone]              Who to contact     If you have questions or need follow up information about today's  clinic visit or your schedule please contact [facility] [place] directly at [phone].  Normal or non-critical lab and imaging results will be communicated to you by MyChart, letter or phone within 4 business days after the clinic has received the results. If you do not hear from us within 7 days, please contact the clinic through [employer] or phone. If you have a critical or abnormal lab result, we will notify you by phone as soon as possible.  Submit refill requests through [employer] or call your pharmacy and they will forward the refill request to us. Please allow 3 business days for your refill to be completed.          Additional Information About Your Visit        [employer] Information     [employer] gives you secure access to your electronic health record. If you see a primary care provider, you can also send messages to your care team and make appointments. If you have questions, please call your primary care clinic.  If you do not have a primary care provider, please call [phone] and they will assist you.        Care EveryWhere ID     This is your Care EveryWhere ID. This could be used by other organizations to access your [place] medical records  [id number]        Your Vitals Were     Last Period BMI (Body Mass Index)                03/07/2018 (Exact Date) 32.46 kg/m2           Blood Pressure from Last 3 Encounters:   10/08/18 110/60   09/26/18 110/76   09/14/18 108/60    Weight from Last 3 Encounters:   10/08/18 210 lb (95.3 kg)   09/26/18 208 lb (94.3 kg)   09/14/18 208 lb (94.3 kg)               Primary Care Provider Office Phone # Fax #    Debby Sen [phone] [address]  [facility] [address]        Equal Access to Services     KERRY CAMPBELL : Romeo Real, dai cota, cherieybinez rueda. So [facility] [phone].    ATENCIÓN: Si habla español, tiene a reyna disposición servicios  gilda de asistencia lingüística. Nicanor walsh [phone].    We comply with applicable federal civil rights laws and [place] laws. We do not discriminate on the basis of race, color, national origin, age, disability, sex, sexual orientation, or gender identity.            Thank you!     Thank you for choosing [facility] [place]  for your care. Our goal is always to provide you with excellent care. Hearing back from our patients is one way we can continue to improve our services. Please take a few minutes to complete the written survey that you may receive in the mail after your visit with us. Thank you!             Your Updated Medication List - Protect others around you: Learn how to safely use, store and throw away your medicines at www.disposemymeds.org.          This list is accurate as of 10/8/18 11:59 PM.  Always use your most recent med list.                   Brand Name Dispense Instructions for use Diagnosis    Prenatal Vitamins 28-0.8 MG Tabs      Take 1 tablet by mouth daily        TYLENOL PO      Take 650 mg by mouth every 4 hours as needed for mild pain or fever (migraines)    Supervision of high-risk pregnancy of elderly multigravida, first trimester       valACYclovir 1000 mg tablet    VALTREX    12 tablet    TAKE 2 TABLETS BY MOUTH TWICE DAILY FOR 1 DAY    Cold sore

## 2018-10-10 NOTE — PROGRESS NOTES
Weight gain better this last 4 weeks so is happy about that  Feeling good FM  No ctx but some rare tightening.  Swelling is minimal  Going to [facility] in a couple days, discussed VTE preventions, compression hose, etc  Return 4 weeks and can do growth U/S

## 2018-10-24 ENCOUNTER — PRENATAL OFFICE VISIT (OUTPATIENT)
Dept: OBGYN | Facility: CLINIC | Age: 33
End: 2018-10-24
Payer: COMMERCIAL

## 2018-10-24 VITALS — SYSTOLIC BLOOD PRESSURE: 110 MMHG | BODY MASS INDEX: 33.42 KG/M2 | WEIGHT: 216.2 LBS | DIASTOLIC BLOOD PRESSURE: 60 MMHG

## 2018-10-24 DIAGNOSIS — O09.529 SUPERVISION OF HIGH-RISK PREGNANCY OF ELDERLY MULTIGRAVIDA: Primary | ICD-10-CM

## 2018-10-24 DIAGNOSIS — O34.219 PREVIOUS CESAREAN SECTION COMPLICATING PREGNANCY: ICD-10-CM

## 2018-10-24 PROCEDURE — 99207 ZZC PRENATAL VISIT: CPT | Performed by: OBSTETRICS & GYNECOLOGY

## 2018-10-24 NOTE — MR AVS SNAPSHOT
After Visit Summary   10/24/2018    Sherie Young    MRN: [id number]           Patient Information     Date Of Birth          1985        Visit Information        Provider Department      10/24/2018 8:30 AM Debby Sen MD [facility] [place]        Today's Diagnoses     Supervision of high-risk pregnancy of elderly multigravida    -  1    Previous  section complicating pregnancy           Follow-ups after your visit        Your next 10 appointments already scheduled     2018  8:50 AM CST   ESTABLISHED PRENATAL with Debby Sen MD   [facility] [place] ([facility] [place])    [address] [place] [address]  [place] [address]   [phone]            2018  8:50 AM CST   ESTABLISHED PRENATAL with Debby Sen MD   [facility] [place] ([facility] [place])    [address] [place] [address] [address]  [place] [address]   [phone]            2018  9:40 AM CST   ESTABLISHED PRENATAL with Debby Sen MD   [facility] [place] ([facility] [place])    [address] [place] [address] [address]  [place] [address]   [phone]            2018  8:50 AM CST   ESTABLISHED PRENATAL with Debby Sen MD   [facility] [place] ([facility] [place])    [address] [place] [address] [address]  [place] [address]   [phone]            Dec 07, 2018   Procedure with Debby Sen MD    Birthplace (--)    [address]  [facility] [address] [phone]              Who to contact     If you have questions or need follow up information about today's clinic visit or your schedule please contact [facility] [place] directly at [phone].  Normal or non-critical lab and imaging results will be communicated to you by MyChart, letter or phone within 4 business days after the clinic has received the results. If you do  not hear from us within 7 days, please contact the clinic through [employer] or phone. If you have a critical or abnormal lab result, we will notify you by phone as soon as possible.  Submit refill requests through [employer] or call your pharmacy and they will forward the refill request to us. Please allow 3 business days for your refill to be completed.          Additional Information About Your Visit        [employer] Information     [employer] gives you secure access to your electronic health record. If you see a primary care provider, you can also send messages to your care team and make appointments. If you have questions, please call your primary care clinic.  If you do not have a primary care provider, please call [phone] and they will assist you.        Care EveryWhere ID     This is your Care EveryWhere ID. This could be used by other organizations to access your [place] medical records  [id number]        Your Vitals Were     Last Period BMI (Body Mass Index)                03/07/2018 (Exact Date) 33.42 kg/m2           Blood Pressure from Last 3 Encounters:   10/24/18 110/60   10/08/18 110/60   09/26/18 110/76    Weight from Last 3 Encounters:   10/24/18 216 lb 3.2 oz (98.1 kg)   10/08/18 210 lb (95.3 kg)   09/26/18 208 lb (94.3 kg)              Today, you had the following     No orders found for display       Primary Care Provider Office Phone # Fax #    Debby Sen [phone] [address] [facility] [address]  [place] [address]        Equal Access to Services     LUCIA CAMPBELL : Hadii aad ku hadasho Soomaali, waaxda luqadaha, qaybta kaalmada adeegyada, waxay carmen shah . So [facility] [phone].    ATENCIÓN: Si habla español, tiene a reyna disposición servicios gratuitos de asistencia lingüística. Llame al [phone].    We comply with applicable federal civil rights laws and [place] laws. We do not discriminate on the basis of race, color, national origin, age,  disability, sex, sexual orientation, or gender identity.            Thank you!     Thank you for choosing [facility] [place]  for your care. Our goal is always to provide you with excellent care. Hearing back from our patients is one way we can continue to improve our services. Please take a few minutes to complete the written survey that you may receive in the mail after your visit with us. Thank you!             Your Updated Medication List - Protect others around you: Learn how to safely use, store and throw away your medicines at www.disposemymeds.org.          This list is accurate as of 10/24/18  8:56 AM.  Always use your most recent med list.                   Brand Name Dispense Instructions for use Diagnosis    Prenatal Vitamins 28-0.8 MG Tabs      Take 1 tablet by mouth daily        TYLENOL PO      Take 650 mg by mouth every 4 hours as needed for mild pain or fever (migraines)    Supervision of high-risk pregnancy of elderly multigravida, first trimester       valACYclovir 1000 mg tablet    VALTREX    12 tablet    TAKE 2 TABLETS BY MOUTH TWICE DAILY FOR 1 DAY    Cold sore

## 2018-11-05 ENCOUNTER — PRENATAL OFFICE VISIT (OUTPATIENT)
Dept: OBGYN | Facility: CLINIC | Age: 33
End: 2018-11-05
Payer: COMMERCIAL

## 2018-11-05 ENCOUNTER — RADIANT APPOINTMENT (OUTPATIENT)
Dept: ULTRASOUND IMAGING | Facility: CLINIC | Age: 33
End: 2018-11-05
Payer: COMMERCIAL

## 2018-11-05 VITALS — BODY MASS INDEX: 34.13 KG/M2 | DIASTOLIC BLOOD PRESSURE: 70 MMHG | WEIGHT: 220.8 LBS | SYSTOLIC BLOOD PRESSURE: 110 MMHG

## 2018-11-05 DIAGNOSIS — O41.90X0: ICD-10-CM

## 2018-11-05 DIAGNOSIS — O26.843 UTERINE SIZE-DATE DISCREPANCY IN THIRD TRIMESTER: ICD-10-CM

## 2018-11-05 DIAGNOSIS — O09.529 SUPERVISION OF HIGH-RISK PREGNANCY OF ELDERLY MULTIGRAVIDA: Primary | ICD-10-CM

## 2018-11-05 DIAGNOSIS — O34.219 PREVIOUS CESAREAN SECTION COMPLICATING PREGNANCY: ICD-10-CM

## 2018-11-05 PROCEDURE — 99207 ZZC PRENATAL VISIT: CPT | Performed by: OBSTETRICS & GYNECOLOGY

## 2018-11-05 PROCEDURE — 76816 OB US FOLLOW-UP PER FETUS: CPT | Performed by: OBSTETRICS & GYNECOLOGY

## 2018-11-05 NOTE — PROGRESS NOTES
Growth U/S done b/c FH appears larger and growth is normal at 5-11#=50%. Fluid is normal but low side at 9.7cm and grossly on image capture appears somewhat less  Patient admits she's not hydrating enough. Stressed with her sister's wedding this Friday and company acquired another company and so she's really stressed and not drinking enough or taking care of herself  Will really focus on hydration and then do a BPP/TESHA when she's back at 36 weeks. Next appointment is in 9 days  Good FM, some BH but nothing regular  Plan cvx check and GBS next time

## 2018-11-05 NOTE — MR AVS SNAPSHOT
After Visit Summary   2018    Sherie Young    MRN: [id number]           Patient Information     Date Of Birth          1985        Visit Information        Provider Department      2018 8:40 AM Debby Sen MD [facility] [place]        Today's Diagnoses     Supervision of high-risk pregnancy of elderly multigravida    -  1    Abnormal amniotic fluid, antepartum, single or unspecified fetus        Previous  section complicating pregnancy           Follow-ups after your visit        Your next 10 appointments already scheduled     2018  8:00 AM CST   US FETAL BIOPHYS PROFILE W/O NON STRESS TEST with WEUS1   [facility] ([facility])    [address] [place] [address] [phone]           How do I prepare for my exam? (Food and drink instructions) Drink four 8-ounce glasses of fluid an hour before your exam. If you need to empty your bladder before your exam, try to release only a little urine. Then, drink another glass of fluid.  How do I prepare for my exam? (Other instructions) You may have up to two family members in the exam room. If you bring a small child, an adult must be there to care for him or her. No video or camera photography during the procedure.  What should I wear: Wear comfortable clothes.  How long does the exam take: Most ultrasounds take 30 to 60 minutes.  What should I bring: Bring a list of your medicines, including vitamins, minerals and over-the-counter drugs. It is safest to leave personal items at home.  Do I need a :  No  is needed.  What do I need to tell my doctor: Tell your doctor about any allergies you may have.  What should I do after the exam: No restrictions, You may resume normal activities.  What is this test: An ultrasound uses sound waves to make pictures of the body. Sound waves do not cause pain. The only discomfort may be the  pressure of the wand against your skin or full bladder.  Who should I call with questions: If you have any questions, please call the Imaging Department where you will have your exam. Directions, parking instructions, and other information is available on our website, [email or web address].            Nov 14, 2018  8:50 AM CST   ESTABLISHED PRENATAL with Debby Sen MD   [facility] [place] ([facility] [place])    [address]  [address]  [place] [address]   [phone]            Nov 19, 2018  9:40 AM CST   ESTABLISHED PRENATAL with Debby Sen MD   [facility] [place] ([facility] [place])    [address]  [address]  [place] [address]   [phone]            Nov 28, 2018  8:50 AM CST   ESTABLISHED PRENATAL with Debby Sen MD   [facility] [place] ([facility] [place])    [address]  [address]  [place] [address]   [phone]            Dec 07, 2018   Procedure with Debby Sen MD    Birthplace (--)    [address]  [facility] [address]   [phone]              Future tests that were ordered for you today     Open Future Orders        Priority Expected Expires Ordered    US OB Fetal Biophys Prf wo NonStrs Singls Sgl Routine  11/5/2019 11/5/2018            Who to contact     If you have questions or need follow up information about today's clinic visit or your schedule please contact [facility] [place] directly at [phone].  Normal or non-critical lab and imaging results will be communicated to you by MyChart, letter or phone within 4 business days after the clinic has received the results. If you do not hear from us within 7 days, please contact the clinic through MyChart or phone. If you have a critical or abnormal lab result, we will notify you by phone as soon as possible.  Submit refill requests through [employer] Direct or call your pharmacy and they  will forward the refill request to us. Please allow 3 business days for your refill to be completed.          Additional Information About Your Visit        Wabrikworkshart Information     Blizuu gives you secure access to your electronic health record. If you see a primary care provider, you can also send messages to your care team and make appointments. If you have questions, please call your primary care clinic.  If you do not have a primary care provider, please call [phone] and they will assist you.        Care EveryWhere ID     This is your Care EveryWhere ID. This could be used by other organizations to access your [facility] medical records  [id number]        Your Vitals Were     Last Period BMI (Body Mass Index)                03/07/2018 (Exact Date) 34.13 kg/m2           Blood Pressure from Last 3 Encounters:   11/05/18 110/70   10/24/18 110/60   10/08/18 110/60    Weight from Last 3 Encounters:   11/05/18 220 lb 12.8 oz (100.2 kg)   10/24/18 216 lb 3.2 oz (98.1 kg)   10/08/18 210 lb (95.3 kg)               Primary Care Provider Office Phone # Fax #    Debby Sen [phone] [address] [facility] [address]  [facility] [address]        Equal Access to Services     LUCIA CAMPBELL AH: Hadii aad ku hadasho Soomaali, waaxda luqadaha, qaybta kaalmada adeegyada, inez shah . So [facility] [phone].    ATENCIÓN: Si habla español, tiene a reyna disposición servicios gratuitos de asistencia lingüística. Llame al [phone].    We comply with applicable federal civil rights laws and [place] laws. We do not discriminate on the basis of race, color, national origin, age, disability, sex, sexual orientation, or gender identity.            Thank you!     Thank you for choosing [facility] [place]  for your care. Our goal is always to provide you with excellent care. Hearing back from our patients is one way we can continue to improve our services. Please take a few  minutes to complete the written survey that you may receive in the mail after your visit with us. Thank you!             Your Updated Medication List - Protect others around you: Learn how to safely use, store and throw away your medicines at www.disposemymeds.org.          This list is accurate as of 11/5/18 10:37 AM.  Always use your most recent med list.                   Brand Name Dispense Instructions for use Diagnosis    Prenatal Vitamins 28-0.8 MG Tabs      Take 1 tablet by mouth daily        TYLENOL PO      Take 650 mg by mouth every 4 hours as needed for mild pain or fever (migraines)    Supervision of high-risk pregnancy of elderly multigravida, first trimester       valACYclovir 1000 mg tablet    VALTREX    12 tablet    TAKE 2 TABLETS BY MOUTH TWICE DAILY FOR 1 DAY    Cold sore

## 2018-11-14 ENCOUNTER — PRENATAL OFFICE VISIT (OUTPATIENT)
Dept: OBGYN | Facility: CLINIC | Age: 33
End: 2018-11-14
Payer: COMMERCIAL

## 2018-11-14 ENCOUNTER — RADIANT APPOINTMENT (OUTPATIENT)
Dept: ULTRASOUND IMAGING | Facility: CLINIC | Age: 33
End: 2018-11-14
Payer: COMMERCIAL

## 2018-11-14 VITALS — WEIGHT: 221.6 LBS | SYSTOLIC BLOOD PRESSURE: 108 MMHG | DIASTOLIC BLOOD PRESSURE: 58 MMHG | BODY MASS INDEX: 34.26 KG/M2

## 2018-11-14 DIAGNOSIS — O09.529 SUPERVISION OF HIGH-RISK PREGNANCY OF ELDERLY MULTIGRAVIDA: Primary | ICD-10-CM

## 2018-11-14 DIAGNOSIS — O34.219 PREVIOUS CESAREAN SECTION COMPLICATING PREGNANCY: ICD-10-CM

## 2018-11-14 DIAGNOSIS — O41.90X0: ICD-10-CM

## 2018-11-14 DIAGNOSIS — Z36.85 ANTENATAL SCREENING FOR STREPTOCOCCUS B: ICD-10-CM

## 2018-11-14 PROCEDURE — 87653 STREP B DNA AMP PROBE: CPT | Performed by: OBSTETRICS & GYNECOLOGY

## 2018-11-14 PROCEDURE — 76819 FETAL BIOPHYS PROFIL W/O NST: CPT | Performed by: OBSTETRICS & GYNECOLOGY

## 2018-11-14 PROCEDURE — 99207 ZZC PRENATAL VISIT: CPT | Performed by: OBSTETRICS & GYNECOLOGY

## 2018-11-14 NOTE — MR AVS SNAPSHOT
After Visit Summary   2018    Sherie Young    MRN: [id number]           Patient Information     Date Of Birth          1985        Visit Information        Provider Department      2018 8:50 AM Debby Sen MD [facility] [place]        Today's Diagnoses     Supervision of high-risk pregnancy of elderly multigravida    -  1     screening for streptococcus B        Previous  section complicating pregnancy           Follow-ups after your visit        Your next 10 appointments already scheduled     2018  9:40 AM CST   ESTABLISHED PRENATAL with Debby Sen MD   [facility] [place] ([facility] [place])    [address]  [address]  [place] [address]   [phone]            2018  8:50 AM CST   ESTABLISHED PRENATAL with Debby Sen MD   [facility] [place] ([facility] [place])    [address]  [place] [address]   [phone]            Dec 07, 2018   Procedure with Debby Sen MD    Birthplace (--)    [address]  [place] [address]   [phone]              Who to contact     If you have questions or need follow up information about today's clinic visit or your schedule please contact [facility] [place] directly at [phone].  Normal or non-critical lab and imaging results will be communicated to you by MyChart, letter or phone within 4 business days after the clinic has received the results. If you do not hear from us within 7 days, please contact the clinic through MyChart or phone. If you have a critical or abnormal lab result, we will notify you by phone as soon as possible.  Submit refill requests through [employer] or call your pharmacy and they will forward the refill request to us. Please allow 3 business days for your refill to be completed.          Additional Information About Your Visit         Angoss Software Information     Angoss Software gives you secure access to your electronic health record. If you see a primary care provider, you can also send messages to your care team and make appointments. If you have questions, please call your primary care clinic.  If you do not have a primary care provider, please call [phone] and they will assist you.        Care EveryWhere ID     This is your Care EveryWhere ID. This could be used by other organizations to access your [place] medical records  [id number]        Your Vitals Were     Last Period Breastfeeding? BMI (Body Mass Index)             03/07/2018 (Exact Date) No 34.26 kg/m2          Blood Pressure from Last 3 Encounters:   11/14/18 108/58   11/05/18 110/70   10/24/18 110/60    Weight from Last 3 Encounters:   11/14/18 221 lb 9.6 oz (100.5 kg)   11/05/18 220 lb 12.8 oz (100.2 kg)   10/24/18 216 lb 3.2 oz (98.1 kg)              We Performed the Following     Group B strep PCR        Primary Care Provider Office Phone # Fax #    Debby Sen [phone] [address] [facility] [address]  [place] [address]        Equal Access to Services     LUCIA CAMPBELL : Hadii devora ponceo Soabigail, waaxda beata, qaybta kaalmada adeemmayada, inez santiago. So [facility] [phone].    ATENCIÓN: Si habla español, tiene a reyna disposición servicios gratuitos de asistencia lingüística. Llame al [phone].    We comply with applicable federal civil rights laws and [place] laws. We do not discriminate on the basis of race, color, national origin, age, disability, sex, sexual orientation, or gender identity.            Thank you!     Thank you for choosing [facility] [place]  for your care. Our goal is always to provide you with excellent care. Hearing back from our patients is one way we can continue to improve our services. Please take a few minutes to complete the written survey that you may receive in the mail after your  visit with us. Thank you!             Your Updated Medication List - Protect others around you: Learn how to safely use, store and throw away your medicines at www.disposemymeds.org.          This list is accurate as of 11/14/18 11:59 PM.  Always use your most recent med list.                   Brand Name Dispense Instructions for use Diagnosis    Prenatal Vitamins 28-0.8 MG Tabs      Take 1 tablet by mouth daily        TYLENOL PO      Take 650 mg by mouth every 4 hours as needed for mild pain or fever (migraines)    Supervision of high-risk pregnancy of elderly multigravida, first trimester       valACYclovir 1000 mg tablet    VALTREX    12 tablet    TAKE 2 TABLETS BY MOUTH TWICE DAILY FOR 1 DAY    Cold sore

## 2018-11-15 LAB
GP B STREP DNA SPEC QL NAA+PROBE: NEGATIVE
SPECIMEN SOURCE: NORMAL

## 2018-11-18 NOTE — PROGRESS NOTES
Doing ok. Got through her sister's wedding. Speech was ok though not as prepared as she wanted  Completely exhausted for 2 days after b/c was running around so much but now is feeling fine  Good FM  Some Bh but nothing regular or painful.   Feeling more swollen this week though her weight is only 1# up from 9 days ago. BP is normal  cvx is closed and firm and posterior but station is -2.  GBS done  Return 1 wk

## 2018-11-19 ENCOUNTER — PRENATAL OFFICE VISIT (OUTPATIENT)
Dept: OBGYN | Facility: CLINIC | Age: 33
End: 2018-11-19
Payer: COMMERCIAL

## 2018-11-19 VITALS — DIASTOLIC BLOOD PRESSURE: 68 MMHG | BODY MASS INDEX: 33.64 KG/M2 | WEIGHT: 217.6 LBS | SYSTOLIC BLOOD PRESSURE: 116 MMHG

## 2018-11-19 DIAGNOSIS — O09.529 SUPERVISION OF HIGH-RISK PREGNANCY OF ELDERLY MULTIGRAVIDA: Primary | ICD-10-CM

## 2018-11-19 DIAGNOSIS — O34.219 PREVIOUS CESAREAN SECTION COMPLICATING PREGNANCY: ICD-10-CM

## 2018-11-19 PROCEDURE — 99207 ZZC PRENATAL VISIT: CPT | Performed by: OBSTETRICS & GYNECOLOGY

## 2018-11-19 NOTE — MR AVS SNAPSHOT
After Visit Summary   2018    Sherie Young    MRN: [id number]           Patient Information     Date Of Birth          1985        Visit Information        Provider Department      2018 9:40 AM Debby Sen MD [facility] [place]        Today's Diagnoses     Supervision of high-risk pregnancy of elderly multigravida    -  1    Previous  section complicating pregnancy           Follow-ups after your visit        Your next 10 appointments already scheduled     2018  8:50 AM CST   ESTABLISHED PRENATAL with Debby Sen MD   [facility] [place] ([facility] [place])    [address]  [address]  [place] [address] [phone]            Dec 07, 2018   Procedure with Debby Sen MD    Birthplace (--)    [address]  [place] [address] [phone]              Who to contact     If you have questions or need follow up information about today's clinic visit or your schedule please contact [facility] [place] directly at [phone].  Normal or non-critical lab and imaging results will be communicated to you by e[employer] Japanhart, letter or phone within 4 business days after the clinic has received the results. If you do not hear from us within 7 days, please contact the clinic through Digital Trowelt or phone. If you have a critical or abnormal lab result, we will notify you by phone as soon as possible.  Submit refill requests through Proposify or call your pharmacy and they will forward the refill request to us. Please allow 3 business days for your refill to be completed.          Additional Information About Your Visit        [employer] Japanhart Information     Proposify gives you secure access to your electronic health record. If you see a primary care provider, you can also send messages to your care team and make appointments. If you have questions, please call your primary care clinic.  If you do not  have a primary care provider, please call [phone] and they will assist you.        Care EveryWhere ID     This is your Care EveryWhere ID. This could be used by other organizations to access your [place] medical records  [id number]        Your Vitals Were     Last Period BMI (Body Mass Index)                03/07/2018 (Exact Date) 33.64 kg/m2           Blood Pressure from Last 3 Encounters:   11/19/18 116/68   11/14/18 108/58   11/05/18 110/70    Weight from Last 3 Encounters:   11/19/18 217 lb 9.6 oz (98.7 kg)   11/14/18 221 lb 9.6 oz (100.5 kg)   11/05/18 220 lb 12.8 oz (100.2 kg)              Today, you had the following     No orders found for display       Primary Care Provider Office Phone # Fax #    Debby Sen [phone] [address] [facility] [address]  [place] [address]        Equal Access to Services     KERRY [facility] : Hadii aad ku hadasho Soomaali, waaxda luqadaha, qaybta kaalmada adeegyada, waxay idiin haymurtaza shah . So [facility] [phone].    ATENCIÓN: Si habla español, tiene a reyna disposición servicios gratuitos de asistencia lingüística. Llame al [phone].    We comply with applicable federal civil rights laws and [place] laws. We do not discriminate on the basis of race, color, national origin, age, disability, sex, sexual orientation, or gender identity.            Thank you!     Thank you for choosing [facility] [place]  for your care. Our goal is always to provide you with excellent care. Hearing back from our patients is one way we can continue to improve our services. Please take a few minutes to complete the written survey that you may receive in the mail after your visit with us. Thank you!             Your Updated Medication List - Protect others around you: Learn how to safely use, store and throw away your medicines at www.disposemymeds.org.          This list is accurate as of 11/19/18  2:16 PM.  Always use your most recent med  list.                   Brand Name Dispense Instructions for use Diagnosis    Prenatal Vitamins 28-0.8 MG Tabs      Take 1 tablet by mouth daily        TYLENOL PO      Take 650 mg by mouth every 4 hours as needed for mild pain or fever (migraines)    Supervision of high-risk pregnancy of elderly multigravida, first trimester       valACYclovir 1000 mg tablet    VALTREX    12 tablet    TAKE 2 TABLETS BY MOUTH TWICE DAILY FOR 1 DAY    Cold sore

## 2018-11-19 NOTE — PROGRESS NOTES
Patient had stomach flu 2 days ago. Threw up for about 12 hours and had diarrhea. No appetite and still felt bad yesterday and now today appetite is back and feeling hungry and trying to hydrate and eat bland  Up side is her swelling in her feet all resolved so now trying to avoid salt and stay hydrated so doesn't come back  Coworker told her about a term stillbirth they had and now can't stop worrying about it. Feeling the baby moving all the time so tried to reassure her that term IUFD is exceedingly rare  FH is normal and +fhts'  Reviewed GBS neg status  Return 1 wk

## 2018-11-28 ENCOUNTER — PRENATAL OFFICE VISIT (OUTPATIENT)
Dept: OBGYN | Facility: CLINIC | Age: 33
End: 2018-11-28
Payer: COMMERCIAL

## 2018-11-28 VITALS — WEIGHT: 221.4 LBS | SYSTOLIC BLOOD PRESSURE: 112 MMHG | DIASTOLIC BLOOD PRESSURE: 60 MMHG | BODY MASS INDEX: 34.22 KG/M2

## 2018-11-28 DIAGNOSIS — O09.529 SUPERVISION OF HIGH-RISK PREGNANCY OF ELDERLY MULTIGRAVIDA: Primary | ICD-10-CM

## 2018-11-28 DIAGNOSIS — O34.219 PREVIOUS CESAREAN SECTION COMPLICATING PREGNANCY: ICD-10-CM

## 2018-11-28 PROCEDURE — 99207 ZZC PRENATAL VISIT: CPT | Performed by: OBSTETRICS & GYNECOLOGY

## 2018-11-28 NOTE — PROGRESS NOTES
Feeling pretty good. Tons of FM  Getting a lot of low pelvic pain/pressure but almost always when bladder is full. Resolved when voids.  Some tightening but painless  Reviewed c/s in detail with the r/b/a, recovery time, restrictions on lifting/driving, hospital course and stay, etc.  No further appts scheduled and c/s is in 9 days

## 2018-11-28 NOTE — MR AVS SNAPSHOT
After Visit Summary   2018    Sherie Young    MRN: [id number]           Patient Information     Date Of Birth          1985        Visit Information        Provider Department      2018 8:50 AM Debby Sen MD [facility] [place]        Today's Diagnoses     Supervision of high-risk pregnancy of elderly multigravida    -  1    Previous  section complicating pregnancy           Follow-ups after your visit        Your next 10 appointments already scheduled     Dec 07, 2018   Procedure with Debby Sen MD    Birthplace (--)    [address]  [place] [address] [phone]              Who to contact     If you have questions or need follow up information about today's clinic visit or your schedule please contact [facility] directly at [phone].  Normal or non-critical lab and imaging results will be communicated to you by MyChart, letter or phone within 4 business days after the clinic has received the results. If you do not hear from us within 7 days, please contact the clinic through MascotaNubehart or phone. If you have a critical or abnormal lab result, we will notify you by phone as soon as possible.  Submit refill requests through WegoWise or call your pharmacy and they will forward the refill request to us. Please allow 3 business days for your refill to be completed.          Additional Information About Your Visit        MyChart Information     WegoWise gives you secure access to your electronic health record. If you see a primary care provider, you can also send messages to your care team and make appointments. If you have questions, please call your primary care clinic.  If you do not have a primary care provider, please call [phone] and they will assist you.        Care EveryWhere ID     This is your Care EveryWhere ID. This could be used by other organizations to access your [facility]  records  [id number]        Your Vitals Were     Last Period BMI (Body Mass Index)                03/07/2018 (Exact Date) 34.22 kg/m2           Blood Pressure from Last 3 Encounters:   11/28/18 112/60   11/19/18 116/68   11/14/18 108/58    Weight from Last 3 Encounters:   11/28/18 221 lb 6.4 oz (100.4 kg)   11/19/18 217 lb 9.6 oz (98.7 kg)   11/14/18 221 lb 9.6 oz (100.5 kg)              Today, you had the following     No orders found for display       Primary Care Provider Office Phone # Fax #    Debby Sen [phone] [address] [address]        Equal Access to Services     LUCIA CAMPBELL : Romeo ponceo Soabigail, waaxda luqadaha, qaybta kaalmada ademainor, inez shah . So [facility] [phone].    ATENCIÓN: Si habla español, tiene a reyna disposición servicios gratuitos de asistencia lingüística. Llame al [phone].    We comply with applicable federal civil rights laws and [place] laws. We do not discriminate on the basis of race, color, national origin, age, disability, sex, sexual orientation, or gender identity.            Thank you!     Thank you for choosing [facility] [place]  for your care. Our goal is always to provide you with excellent care. Hearing back from our patients is one way we can continue to improve our services. Please take a few minutes to complete the written survey that you may receive in the mail after your visit with us. Thank you!             Your Updated Medication List - Protect others around you: Learn how to safely use, store and throw away your medicines at www.disposemymeds.org.          This list is accurate as of 11/28/18 12:48 PM.  Always use your most recent med list.                   Brand Name Dispense Instructions for use Diagnosis    Prenatal Vitamins 28-0.8 MG Tabs      Take 1 tablet by mouth daily        TYLENOL PO      Take 650 mg by mouth every 4 hours as needed for mild  pain or fever (migraines)    Supervision of high-risk pregnancy of elderly multigravida, first trimester       valACYclovir 1000 mg tablet    VALTREX    12 tablet    TAKE 2 TABLETS BY MOUTH TWICE DAILY FOR 1 DAY    Cold sore

## 2018-12-07 ENCOUNTER — ANESTHESIA EVENT (OUTPATIENT)
Dept: OBGYN | Facility: CLINIC | Age: 33
End: 2018-12-07
Payer: COMMERCIAL

## 2018-12-07 ENCOUNTER — ANESTHESIA (OUTPATIENT)
Dept: OBGYN | Facility: CLINIC | Age: 33
End: 2018-12-07
Payer: COMMERCIAL

## 2018-12-07 ENCOUNTER — HOSPITAL ENCOUNTER (INPATIENT)
Facility: CLINIC | Age: 33
LOS: 3 days | Discharge: HOME | End: 2018-12-10
Attending: OBSTETRICS & GYNECOLOGY | Admitting: OBSTETRICS & GYNECOLOGY
Payer: COMMERCIAL

## 2018-12-07 DIAGNOSIS — Z98.891 S/P CESAREAN SECTION: Primary | ICD-10-CM

## 2018-12-07 PROBLEM — Z34.90 PREGNANCY: Status: ACTIVE | Noted: 2018-12-07

## 2018-12-07 LAB
ABO + RH BLD: NORMAL
ABO + RH BLD: NORMAL
BLD GP AB SCN SERPL QL: NORMAL
BLOOD BANK CMNT PATIENT-IMP: NORMAL
BLOOD BANK CMNT PATIENT-IMP: NORMAL
ERYTHROCYTE [DISTWIDTH] IN BLOOD BY AUTOMATED COUNT: 12.7 % (ref 10–15)
HCT VFR BLD AUTO: 31.2 % (ref 35–47)
HGB BLD-MCNC: 10.7 G/DL (ref 11.7–15.7)
MCH RBC QN AUTO: 30.7 PG (ref 26.5–33)
MCHC RBC AUTO-ENTMCNC: 34.3 G/DL (ref 31.5–36.5)
MCV RBC AUTO: 90 FL (ref 78–100)
PLATELET # BLD AUTO: 256 10E9/L (ref 150–450)
RBC # BLD AUTO: 3.48 10E12/L (ref 3.8–5.2)
SPECIMEN EXP DATE BLD: NORMAL
T PALLIDUM AB SER QL: NONREACTIVE
WBC # BLD AUTO: 7.2 10E9/L (ref 4–11)

## 2018-12-07 PROCEDURE — 37000008 ZZH ANESTHESIA TECHNICAL FEE, 1ST 30 MIN: Performed by: OBSTETRICS & GYNECOLOGY

## 2018-12-07 PROCEDURE — 25000128 H RX IP 250 OP 636: Performed by: OBSTETRICS & GYNECOLOGY

## 2018-12-07 PROCEDURE — 25000566 ZZH SEVOFLURANE, EA 15 MIN: Performed by: OBSTETRICS & GYNECOLOGY

## 2018-12-07 PROCEDURE — 86780 TREPONEMA PALLIDUM: CPT | Performed by: OBSTETRICS & GYNECOLOGY

## 2018-12-07 PROCEDURE — 86901 BLOOD TYPING SEROLOGIC RH(D): CPT | Performed by: OBSTETRICS & GYNECOLOGY

## 2018-12-07 PROCEDURE — 85027 COMPLETE CBC AUTOMATED: CPT | Performed by: OBSTETRICS & GYNECOLOGY

## 2018-12-07 PROCEDURE — 25000128 H RX IP 250 OP 636

## 2018-12-07 PROCEDURE — 71000013 ZZH RECOVERY PHASE 1 LEVEL 1 EA ADDTL HR: Performed by: OBSTETRICS & GYNECOLOGY

## 2018-12-07 PROCEDURE — 25000132 ZZH RX MED GY IP 250 OP 250 PS 637: Performed by: OBSTETRICS & GYNECOLOGY

## 2018-12-07 PROCEDURE — 25000125 ZZHC RX 250: Performed by: NURSE ANESTHETIST, CERTIFIED REGISTERED

## 2018-12-07 PROCEDURE — 25000125 ZZHC RX 250: Performed by: OBSTETRICS & GYNECOLOGY

## 2018-12-07 PROCEDURE — 25000132 ZZH RX MED GY IP 250 OP 250 PS 637

## 2018-12-07 PROCEDURE — 36000056 ZZH SURGERY LEVEL 3 1ST 30 MIN: Performed by: OBSTETRICS & GYNECOLOGY

## 2018-12-07 PROCEDURE — 36000058 ZZH SURGERY LEVEL 3 EA 15 ADDTL MIN: Performed by: OBSTETRICS & GYNECOLOGY

## 2018-12-07 PROCEDURE — 25000128 H RX IP 250 OP 636: Performed by: NURSE ANESTHETIST, CERTIFIED REGISTERED

## 2018-12-07 PROCEDURE — 71000012 ZZH RECOVERY PHASE 1 LEVEL 1 FIRST HR: Performed by: OBSTETRICS & GYNECOLOGY

## 2018-12-07 PROCEDURE — 12000037 ZZH R&B POSTPARTUM INTERMEDIATE

## 2018-12-07 PROCEDURE — 86850 RBC ANTIBODY SCREEN: CPT | Performed by: OBSTETRICS & GYNECOLOGY

## 2018-12-07 PROCEDURE — 59510 CESAREAN DELIVERY: CPT | Performed by: OBSTETRICS & GYNECOLOGY

## 2018-12-07 PROCEDURE — 27210794 ZZH OR GENERAL SUPPLY STERILE: Performed by: OBSTETRICS & GYNECOLOGY

## 2018-12-07 PROCEDURE — 37000009 ZZH ANESTHESIA TECHNICAL FEE, EACH ADDTL 15 MIN: Performed by: OBSTETRICS & GYNECOLOGY

## 2018-12-07 PROCEDURE — 36415 COLL VENOUS BLD VENIPUNCTURE: CPT | Performed by: OBSTETRICS & GYNECOLOGY

## 2018-12-07 PROCEDURE — 86900 BLOOD TYPING SEROLOGIC ABO: CPT | Performed by: OBSTETRICS & GYNECOLOGY

## 2018-12-07 RX ORDER — CEFAZOLIN SODIUM 2 G/100ML
2 INJECTION, SOLUTION INTRAVENOUS
Status: COMPLETED | OUTPATIENT
Start: 2018-12-07 | End: 2018-12-07

## 2018-12-07 RX ORDER — OXYCODONE HYDROCHLORIDE 5 MG/1
5-10 TABLET ORAL
Status: DISCONTINUED | OUTPATIENT
Start: 2018-12-07 | End: 2018-12-10 | Stop reason: HOSPADM

## 2018-12-07 RX ORDER — LIDOCAINE 40 MG/G
CREAM TOPICAL
Status: DISCONTINUED | OUTPATIENT
Start: 2018-12-07 | End: 2018-12-10 | Stop reason: HOSPADM

## 2018-12-07 RX ORDER — SIMETHICONE 80 MG
80 TABLET,CHEWABLE ORAL 4 TIMES DAILY PRN
Status: DISCONTINUED | OUTPATIENT
Start: 2018-12-07 | End: 2018-12-10 | Stop reason: HOSPADM

## 2018-12-07 RX ORDER — BUPIVACAINE HYDROCHLORIDE 7.5 MG/ML
INJECTION, SOLUTION INTRASPINAL
Status: DISCONTINUED
Start: 2018-12-07 | End: 2018-12-07 | Stop reason: HOSPADM

## 2018-12-07 RX ORDER — ACETAMINOPHEN 325 MG/1
650 TABLET ORAL EVERY 4 HOURS PRN
Status: DISCONTINUED | OUTPATIENT
Start: 2018-12-10 | End: 2018-12-10 | Stop reason: HOSPADM

## 2018-12-07 RX ORDER — ACETAMINOPHEN 325 MG/1
TABLET ORAL
Status: COMPLETED
Start: 2018-12-07 | End: 2018-12-07

## 2018-12-07 RX ORDER — MORPHINE SULFATE 1 MG/ML
INJECTION, SOLUTION EPIDURAL; INTRATHECAL; INTRAVENOUS PRN
Status: DISCONTINUED | OUTPATIENT
Start: 2018-12-07 | End: 2018-12-07

## 2018-12-07 RX ORDER — ACETAMINOPHEN 325 MG/1
TABLET ORAL
Status: DISCONTINUED
Start: 2018-12-07 | End: 2018-12-07 | Stop reason: WASHOUT

## 2018-12-07 RX ORDER — ACETAMINOPHEN 325 MG/1
975 TABLET ORAL EVERY 8 HOURS
Status: COMPLETED | OUTPATIENT
Start: 2018-12-07 | End: 2018-12-10

## 2018-12-07 RX ORDER — CEFAZOLIN SODIUM 2 G/100ML
INJECTION, SOLUTION INTRAVENOUS
Status: DISCONTINUED
Start: 2018-12-07 | End: 2018-12-07 | Stop reason: HOSPADM

## 2018-12-07 RX ORDER — CEFAZOLIN SODIUM 1 G/3ML
1 INJECTION, POWDER, FOR SOLUTION INTRAMUSCULAR; INTRAVENOUS SEE ADMIN INSTRUCTIONS
Status: DISCONTINUED | OUTPATIENT
Start: 2018-12-07 | End: 2018-12-07 | Stop reason: HOSPADM

## 2018-12-07 RX ORDER — NALOXONE HYDROCHLORIDE 0.4 MG/ML
.1-.4 INJECTION, SOLUTION INTRAMUSCULAR; INTRAVENOUS; SUBCUTANEOUS
Status: DISCONTINUED | OUTPATIENT
Start: 2018-12-07 | End: 2018-12-10 | Stop reason: HOSPADM

## 2018-12-07 RX ORDER — OXYTOCIN/0.9 % SODIUM CHLORIDE 30/500 ML
PLASTIC BAG, INJECTION (ML) INTRAVENOUS PRN
Status: DISCONTINUED | OUTPATIENT
Start: 2018-12-07 | End: 2018-12-07

## 2018-12-07 RX ORDER — LIDOCAINE 40 MG/G
CREAM TOPICAL
Status: DISCONTINUED | OUTPATIENT
Start: 2018-12-07 | End: 2018-12-07 | Stop reason: HOSPADM

## 2018-12-07 RX ORDER — KETOROLAC TROMETHAMINE 30 MG/ML
INJECTION, SOLUTION INTRAMUSCULAR; INTRAVENOUS
Status: COMPLETED
Start: 2018-12-07 | End: 2018-12-07

## 2018-12-07 RX ORDER — EPHEDRINE SULFATE 50 MG/ML
INJECTION, SOLUTION INTRAMUSCULAR; INTRAVENOUS; SUBCUTANEOUS PRN
Status: DISCONTINUED | OUTPATIENT
Start: 2018-12-07 | End: 2018-12-07

## 2018-12-07 RX ORDER — ACETAMINOPHEN 325 MG/1
975 TABLET ORAL ONCE
Status: COMPLETED | OUTPATIENT
Start: 2018-12-07 | End: 2018-12-07

## 2018-12-07 RX ORDER — OXYTOCIN/0.9 % SODIUM CHLORIDE 30/500 ML
100 PLASTIC BAG, INJECTION (ML) INTRAVENOUS CONTINUOUS
Status: DISCONTINUED | OUTPATIENT
Start: 2018-12-07 | End: 2018-12-10 | Stop reason: HOSPADM

## 2018-12-07 RX ORDER — AMOXICILLIN 250 MG
1 CAPSULE ORAL 2 TIMES DAILY PRN
Status: DISCONTINUED | OUTPATIENT
Start: 2018-12-07 | End: 2018-12-10 | Stop reason: HOSPADM

## 2018-12-07 RX ORDER — HYDROCORTISONE 2.5 %
CREAM (GRAM) TOPICAL 3 TIMES DAILY PRN
Status: DISCONTINUED | OUTPATIENT
Start: 2018-12-07 | End: 2018-12-10 | Stop reason: HOSPADM

## 2018-12-07 RX ORDER — ONDANSETRON 2 MG/ML
4 INJECTION INTRAMUSCULAR; INTRAVENOUS EVERY 6 HOURS PRN
Status: DISCONTINUED | OUTPATIENT
Start: 2018-12-07 | End: 2018-12-10 | Stop reason: HOSPADM

## 2018-12-07 RX ORDER — MORPHINE SULFATE 1 MG/ML
INJECTION, SOLUTION EPIDURAL; INTRATHECAL; INTRAVENOUS
Status: DISCONTINUED
Start: 2018-12-07 | End: 2018-12-07 | Stop reason: HOSPADM

## 2018-12-07 RX ORDER — LANOLIN 100 %
OINTMENT (GRAM) TOPICAL
Status: DISCONTINUED | OUTPATIENT
Start: 2018-12-07 | End: 2018-12-10 | Stop reason: HOSPADM

## 2018-12-07 RX ORDER — ONDANSETRON 2 MG/ML
INJECTION INTRAMUSCULAR; INTRAVENOUS PRN
Status: DISCONTINUED | OUTPATIENT
Start: 2018-12-07 | End: 2018-12-07

## 2018-12-07 RX ORDER — BUPIVACAINE HYDROCHLORIDE 7.5 MG/ML
INJECTION, SOLUTION INTRASPINAL PRN
Status: DISCONTINUED | OUTPATIENT
Start: 2018-12-07 | End: 2018-12-07

## 2018-12-07 RX ORDER — AMOXICILLIN 250 MG
2 CAPSULE ORAL 2 TIMES DAILY PRN
Status: DISCONTINUED | OUTPATIENT
Start: 2018-12-07 | End: 2018-12-10 | Stop reason: HOSPADM

## 2018-12-07 RX ORDER — FENTANYL CITRATE 50 UG/ML
INJECTION, SOLUTION INTRAMUSCULAR; INTRAVENOUS
Status: COMPLETED
Start: 2018-12-07 | End: 2018-12-07

## 2018-12-07 RX ORDER — OXYTOCIN 10 [USP'U]/ML
10 INJECTION, SOLUTION INTRAMUSCULAR; INTRAVENOUS
Status: DISCONTINUED | OUTPATIENT
Start: 2018-12-07 | End: 2018-12-10 | Stop reason: HOSPADM

## 2018-12-07 RX ORDER — FENTANYL CITRATE 50 UG/ML
INJECTION, SOLUTION INTRAMUSCULAR; INTRAVENOUS PRN
Status: DISCONTINUED | OUTPATIENT
Start: 2018-12-07 | End: 2018-12-07

## 2018-12-07 RX ORDER — NALBUPHINE HYDROCHLORIDE 10 MG/ML
2.5-5 INJECTION, SOLUTION INTRAMUSCULAR; INTRAVENOUS; SUBCUTANEOUS EVERY 6 HOURS PRN
Status: DISCONTINUED | OUTPATIENT
Start: 2018-12-07 | End: 2018-12-10 | Stop reason: HOSPADM

## 2018-12-07 RX ORDER — ONDANSETRON 2 MG/ML
INJECTION INTRAMUSCULAR; INTRAVENOUS
Status: DISCONTINUED
Start: 2018-12-07 | End: 2018-12-07 | Stop reason: HOSPADM

## 2018-12-07 RX ORDER — EPHEDRINE SULFATE 50 MG/ML
5 INJECTION, SOLUTION INTRAMUSCULAR; INTRAVENOUS; SUBCUTANEOUS
Status: DISCONTINUED | OUTPATIENT
Start: 2018-12-07 | End: 2018-12-10 | Stop reason: HOSPADM

## 2018-12-07 RX ORDER — CITRIC ACID/SODIUM CITRATE 334-500MG
SOLUTION, ORAL ORAL
Status: COMPLETED
Start: 2018-12-07 | End: 2018-12-07

## 2018-12-07 RX ORDER — KETOROLAC TROMETHAMINE 30 MG/ML
30 INJECTION, SOLUTION INTRAMUSCULAR; INTRAVENOUS EVERY 6 HOURS
Status: COMPLETED | OUTPATIENT
Start: 2018-12-07 | End: 2018-12-08

## 2018-12-07 RX ORDER — OXYTOCIN/0.9 % SODIUM CHLORIDE 30/500 ML
340 PLASTIC BAG, INJECTION (ML) INTRAVENOUS CONTINUOUS PRN
Status: DISCONTINUED | OUTPATIENT
Start: 2018-12-07 | End: 2018-12-10 | Stop reason: HOSPADM

## 2018-12-07 RX ORDER — OXYTOCIN/0.9 % SODIUM CHLORIDE 30/500 ML
PLASTIC BAG, INJECTION (ML) INTRAVENOUS
Status: DISCONTINUED
Start: 2018-12-07 | End: 2018-12-07 | Stop reason: HOSPADM

## 2018-12-07 RX ORDER — DEXTROSE, SODIUM CHLORIDE, SODIUM LACTATE, POTASSIUM CHLORIDE, AND CALCIUM CHLORIDE 5; .6; .31; .03; .02 G/100ML; G/100ML; G/100ML; G/100ML; G/100ML
INJECTION, SOLUTION INTRAVENOUS CONTINUOUS
Status: DISCONTINUED | OUTPATIENT
Start: 2018-12-07 | End: 2018-12-10 | Stop reason: HOSPADM

## 2018-12-07 RX ORDER — BISACODYL 10 MG
10 SUPPOSITORY, RECTAL RECTAL DAILY PRN
Status: DISCONTINUED | OUTPATIENT
Start: 2018-12-09 | End: 2018-12-10 | Stop reason: HOSPADM

## 2018-12-07 RX ORDER — PRENATAL VIT/IRON FUM/FOLIC AC 27MG-0.8MG
1 TABLET ORAL DAILY
Status: DISCONTINUED | OUTPATIENT
Start: 2018-12-07 | End: 2018-12-10 | Stop reason: HOSPADM

## 2018-12-07 RX ORDER — IBUPROFEN 400 MG/1
800 TABLET, FILM COATED ORAL EVERY 6 HOURS PRN
Status: DISCONTINUED | OUTPATIENT
Start: 2018-12-07 | End: 2018-12-10 | Stop reason: HOSPADM

## 2018-12-07 RX ORDER — HYDROMORPHONE HYDROCHLORIDE 1 MG/ML
.3-.5 INJECTION, SOLUTION INTRAMUSCULAR; INTRAVENOUS; SUBCUTANEOUS EVERY 30 MIN PRN
Status: DISCONTINUED | OUTPATIENT
Start: 2018-12-07 | End: 2018-12-10 | Stop reason: HOSPADM

## 2018-12-07 RX ORDER — SODIUM CHLORIDE, SODIUM LACTATE, POTASSIUM CHLORIDE, CALCIUM CHLORIDE 600; 310; 30; 20 MG/100ML; MG/100ML; MG/100ML; MG/100ML
INJECTION, SOLUTION INTRAVENOUS CONTINUOUS
Status: DISCONTINUED | OUTPATIENT
Start: 2018-12-07 | End: 2018-12-07 | Stop reason: HOSPADM

## 2018-12-07 RX ORDER — CITRIC ACID/SODIUM CITRATE 334-500MG
30 SOLUTION, ORAL ORAL
Status: COMPLETED | OUTPATIENT
Start: 2018-12-07 | End: 2018-12-07

## 2018-12-07 RX ADMIN — KETOROLAC TROMETHAMINE 30 MG: 30 INJECTION, SOLUTION INTRAMUSCULAR at 09:04

## 2018-12-07 RX ADMIN — SODIUM CHLORIDE, POTASSIUM CHLORIDE, SODIUM LACTATE AND CALCIUM CHLORIDE: 600; 310; 30; 20 INJECTION, SOLUTION INTRAVENOUS at 07:06

## 2018-12-07 RX ADMIN — KETOROLAC TROMETHAMINE 30 MG: 30 INJECTION, SOLUTION INTRAMUSCULAR at 21:48

## 2018-12-07 RX ADMIN — SODIUM CHLORIDE, POTASSIUM CHLORIDE, SODIUM LACTATE AND CALCIUM CHLORIDE 125 ML/HR: 600; 310; 30; 20 INJECTION, SOLUTION INTRAVENOUS at 05:41

## 2018-12-07 RX ADMIN — BUPIVACAINE HYDROCHLORIDE IN DEXTROSE 12 MG: 7.5 INJECTION, SOLUTION SUBARACHNOID at 07:15

## 2018-12-07 RX ADMIN — Medication 30 ML: at 06:50

## 2018-12-07 RX ADMIN — Medication 5 MG: at 07:21

## 2018-12-07 RX ADMIN — OXYTOCIN-SODIUM CHLORIDE 0.9% IV SOLN 30 UNIT/500ML 100 ML/HR: 30-0.9/5 SOLUTION at 11:37

## 2018-12-07 RX ADMIN — Medication 5 MG: at 07:24

## 2018-12-07 RX ADMIN — ACETAMINOPHEN 975 MG: 325 TABLET, FILM COATED ORAL at 06:00

## 2018-12-07 RX ADMIN — SODIUM CHLORIDE, POTASSIUM CHLORIDE, SODIUM LACTATE AND CALCIUM CHLORIDE: 600; 310; 30; 20 INJECTION, SOLUTION INTRAVENOUS at 07:44

## 2018-12-07 RX ADMIN — SODIUM CHLORIDE, SODIUM LACTATE, POTASSIUM CHLORIDE, CALCIUM CHLORIDE AND DEXTROSE MONOHYDRATE: 5; 600; 310; 30; 20 INJECTION, SOLUTION INTRAVENOUS at 17:00

## 2018-12-07 RX ADMIN — Medication 5 MG: at 07:31

## 2018-12-07 RX ADMIN — KETOROLAC TROMETHAMINE 30 MG: 30 INJECTION, SOLUTION INTRAMUSCULAR at 14:45

## 2018-12-07 RX ADMIN — SODIUM CHLORIDE, POTASSIUM CHLORIDE, SODIUM LACTATE AND CALCIUM CHLORIDE 1000 ML: 600; 310; 30; 20 INJECTION, SOLUTION INTRAVENOUS at 06:49

## 2018-12-07 RX ADMIN — ACETAMINOPHEN 975 MG: 325 TABLET ORAL at 06:00

## 2018-12-07 RX ADMIN — OXYTOCIN-SODIUM CHLORIDE 0.9% IV SOLN 30 UNIT/500ML 100 ML/HR: 30-0.9/5 SOLUTION at 08:35

## 2018-12-07 RX ADMIN — ACETAMINOPHEN 975 MG: 325 TABLET, FILM COATED ORAL at 21:47

## 2018-12-07 RX ADMIN — FENTANYL CITRATE 50 MCG: 50 INJECTION, SOLUTION INTRAMUSCULAR; INTRAVENOUS at 08:19

## 2018-12-07 RX ADMIN — Medication 340 ML: at 07:39

## 2018-12-07 RX ADMIN — PHENYLEPHRINE HYDROCHLORIDE 100 MCG: 10 INJECTION, SOLUTION INTRAMUSCULAR; INTRAVENOUS; SUBCUTANEOUS at 07:19

## 2018-12-07 RX ADMIN — SODIUM CITRATE AND CITRIC ACID MONOHYDRATE 30 ML: 500; 334 SOLUTION ORAL at 06:50

## 2018-12-07 RX ADMIN — CEFAZOLIN SODIUM 2 G: 2 INJECTION, SOLUTION INTRAVENOUS at 07:12

## 2018-12-07 RX ADMIN — MORPHINE SULFATE 0.2 MG: 1 INJECTION, SOLUTION EPIDURAL; INTRATHECAL; INTRAVENOUS at 07:15

## 2018-12-07 RX ADMIN — ACETAMINOPHEN 975 MG: 325 TABLET, FILM COATED ORAL at 14:46

## 2018-12-07 RX ADMIN — ONDANSETRON 4 MG: 2 INJECTION INTRAMUSCULAR; INTRAVENOUS at 07:11

## 2018-12-07 RX ADMIN — Medication 100 ML: at 08:11

## 2018-12-07 RX ADMIN — PHENYLEPHRINE HYDROCHLORIDE 50 MCG: 10 INJECTION, SOLUTION INTRAMUSCULAR; INTRAVENOUS; SUBCUTANEOUS at 07:31

## 2018-12-07 ASSESSMENT — ENCOUNTER SYMPTOMS: SEIZURES: 0

## 2018-12-07 NOTE — LACTATION NOTE
Initial Lactation visit. Recommend unlimited, frequent breast feedings, at least 8 - 12 times every 24 hours.  Avoid pacifiers and supplementation with formula unless medically indicated.   Explained benefits of holding baby skin on skin to help promote better breastfeeding outcomes. Mom reports good initial latch and vigorous suckle X 30 min on first breast and 10-15 min on second. She has a history of low milk supply and would like to start pumping soon. Will continue to follow and verify latch N Day RN IBCLC.

## 2018-12-07 NOTE — PLAN OF CARE
Patient arrived to the room at 1040. Patient and family oriented to room and floor. Infant safety discussed. Call light within reach. Pulse oximeter applied. Miranda Hernandez RN resumed care at 1055.

## 2018-12-07 NOTE — PLAN OF CARE
[id number]  Patient here for Scheduled Repeat  Section. Patient is a  at 39 2/7 weeks. Who denies problems with pregnancy. Monitors applied, patient nervous. All pre op cares cares completed. Tylenol po with sips of water at 0600. Up several times to use bathroom . Basil hugger gown on and heating started at 0620 along with fluid bolus. All questions answered. Monitor off before abdominal prep; moderate variability and accelerations seen. No decelerations  0650 MDA into see patient  0655- Dr. Sen into see patient. Patient has no questions, consent signed.  0705- report to Zulema Crain RN

## 2018-12-07 NOTE — ANESTHESIA PROCEDURE NOTES
Peripheral nerve/Neuraxial procedure note : intrathecal  Pre-Procedure  Performed by DAREK REINA  Location: OB, OR      Pre-Anesthestic Checklist: patient identified, IV checked, site marked, risks and benefits discussed, informed consent, monitors and equipment checked, pre-op evaluation, at physician/surgeon's request and post-op pain management    Timeout  Correct Patient: Yes   Correct Procedure: Yes   Correct Site: Yes   Correct Laterality: N/A   Correct Position: Yes   Site Marked: Yes   .   Procedure Documentation    .    Procedure:    Intrathecal.  Insertion Site:L3-4  (midline approach)      Patient Prep;mask, sterile gloves, povidone-iodine 7.5% surgical scrub, patient draped.  .  Needle: Jefferson tip Spinal Needle (gauge): 25  Spinal/LP Needle Length (inches): 3 # of attempts: 1 and # of redirects:  Introducer used .       Assessment/Narrative  Paresthesias: No.  .  .  clear CSF fluid removed . Comments:  No complications

## 2018-12-07 NOTE — ANESTHESIA POSTPROCEDURE EVALUATION
Patient: Sherie Young    Procedure(s):  REPEAT  SECTION    Diagnosis:PREVIOUS, REQUESTING REPEAT  Diagnosis Additional Information: No value filed.    Anesthesia Type:  Spinal    Note:  Anesthesia Post Evaluation    Patient location during evaluation: PACU  Patient participation: Able to fully participate in evaluation  Level of consciousness: awake and alert  Pain management: satisfactory to patient  Airway patency: patent  Cardiovascular status: hemodynamically stable  Respiratory status: acceptable and unassisted  Hydration status: balanced  PONV: none     Anesthetic complications: None          Last vitals:  Vitals:    18 1130 18 1200 18 1259   BP: 122/66 129/70 132/75   Pulse: 54 60 53   Resp:  18   Temp:   36.6  C (97.8  F)   SpO2: 100% 100% 100%         Electronically Signed By: Eugenio Weeks MD  2018  1:05 PM

## 2018-12-07 NOTE — ANESTHESIA PREPROCEDURE EVALUATION
Anesthesia Evaluation     . Pt has had prior anesthetic.     No history of anesthetic complications          ROS/MED HX    ENT/Pulmonary:     (+)asthma , . .   (-) sleep apnea   Neurologic:      (-) seizures   Cardiovascular:        (-) hypertension   METS/Exercise Tolerance:     Hematologic:         Musculoskeletal:         GI/Hepatic:        (-) GERD and liver disease   Renal/Genitourinary:      (-) renal disease   Endo:      (-) Type II DM and thyroid disease   Psychiatric:         Infectious Disease:         Malignancy:         Other:                     Physical Exam  Normal systems: cardiovascular, pulmonary and dental    Airway   Mallampati: II  TM distance: >3 FB  Neck ROM: full    Dental     Cardiovascular       Pulmonary                     Anesthesia Plan      History & Physical Review  History and physical reviewed and following examination; no interval change.    ASA Status:  2 .    NPO Status:  > 8 hours    Plan for Spinal   PONV prophylaxis:  Ondansetron (or other 5HT-3)       Postoperative Care  Postoperative pain management:  Multi-modal analgesia.      Consents  Anesthetic plan, risks, benefits and alternatives discussed with:  Patient and Spouse..          Procedure: Procedure(s):  REPEAT  SECTION  Preop diagnosis: PREVIOUS, REQUESTING REPEAT    Allergies   Allergen Reactions     Seasonal Allergies      Past Medical History:   Diagnosis Date     Asthma     exercise induced     Cold sore 2016     HSV-1 (herpes simplex virus 1) infection      Migraines     historical, now w/aura this pregnancy     Miscarriage      Past Surgical History:   Procedure Laterality Date      SECTION N/A 2016    Procedure:  SECTION;  Surgeon: Debby Sen MD;  Location:  L+D     Prior to Admission medications    Medication Sig Start Date End Date Taking? Authorizing Provider   Acetaminophen (TYLENOL PO) Take 650 mg by mouth every 4 hours as needed for mild pain or fever  (migraines)   Yes Reported, Patient   Prenatal Vit-Fe Fumarate-FA (PRENATAL VITAMINS) 28-0.8 MG TABS Take 1 tablet by mouth daily    Yes Debby Sen MD   valACYclovir (VALTREX) 1000 mg tablet TAKE 2 TABLETS BY MOUTH TWICE DAILY FOR 1 DAY 9/12/18  Yes Debby Sen MD     Current Facility-Administered Medications Ordered in Epic   Medication Dose Route Frequency Last Rate Last Dose     ceFAZolin (ANCEF) 1 g vial to attach to  ml bag for ADULT or 50 ml bag for PEDS  1 g Intravenous See Admin Instructions         ceFAZolin (ANCEF) intermittent infusion 2 g in 100 mL dextrose PRE-MIX  2 g Intravenous Pre-Op/Pre-procedure x 1 dose         ceFAZolin-dextrose (ANCEF) 2-4 GM/100ML-% infusion             lactated ringers infusion   Intravenous Continuous 125 mL/hr at 12/07/18 0541 125 mL/hr at 12/07/18 0541     lidocaine (LMX4) cream   Topical Q1H PRN         lidocaine 1 % 1 mL  1 mL Other Q1H PRN         sodium chloride (PF) 0.9% PF flush 3 mL  3 mL Intravenous Q1H PRN         sodium chloride (PF) 0.9% PF flush 3 mL  3 mL Intravenous Q8H         No current [facility]-ordered outpatient prescriptions on file.     Wt Readings from Last 1 Encounters:   11/28/18 100.4 kg (221 lb 6.4 oz)     Temp Readings from Last 1 Encounters:   12/07/18 36.9  C (98.4  F)     BP Readings from Last 6 Encounters:   12/07/18 120/70   11/28/18 112/60   11/19/18 116/68   11/14/18 108/58   11/05/18 110/70   10/24/18 110/60     Pulse Readings from Last 4 Encounters:   05/08/18 81   02/26/18 68   09/23/16 51   03/02/16 73     Resp Readings from Last 1 Encounters:   12/07/18 16     SpO2 Readings from Last 1 Encounters:   09/24/16 94%     No results for input(s): NA, POTASSIUM, CHLORIDE, CO2, ANIONGAP, GLC, BUN, CR, JENISE in the last 24154 hours.  Recent Labs   Lab Test  12/07/18   0545  09/14/18   0924  05/08/18   1015   WBC  7.2   --   5.1   HGB  10.7*  11.1*  11.8   PLT  256   --   210     No results for input(s): INR in the last 53907  hours.    Invalid input(s): APTT   RECENT LABS:   ECG:   ECHO:   CXR:                    .

## 2018-12-07 NOTE — L&D DELIVERY NOTE
Obstetrics Brief Operative Note    Pre-operative diagnosis: IUP @ 39+2 with previous c/s requesting repeat   Post-operative diagnosis: Same   Procedure: Repeat low transverse  section   Surgeon: Debby Sen MD   Assistant(s): None   Anesthesia: Spinal anesthesia   Estimated blood loss: 700ml   Complications: None   Findings: Live   Female  Cephalic presentation:  left occiput anterior  APGARS: 1 minute: 9   5 minute: 9  Weight: 7-7#  Placenta: NORMAL   Tubes: normal  Uterus: normal  Ovaries: normal  Clear AF     Primary OB: [facility]

## 2018-12-07 NOTE — H&P
[facility]    History and Physical  Obstetrics and Gynecology     Date of Admission:  2018    Assessment & Plan   Sherie Young is a 33 year old female who presents for Repeat  section.     ASSESSMENT:   IUP @ 39w2d with previous  section requesting repeat        PLAN:   Scheduled for  section.  No questions.      History of Present Illness   Sherie Young is a 33 year old female  39w2d  Estimated Date of Delivery: Dec 12, 2018 is calculated from Patient's last menstrual period was 2018 (exact date). [facility].  Pregnancy complicated only by AMA but normal genetic testing. Had a c/s at 40+0 for arrest of descent and CPD with her first so repeat c/s planned this time.    PRENATAL COURSE  Prenatal course was complicated by above      Recent Labs   Lab Test  18   0545   ABO  A   RH  Neg   AS  Neg     Rhogam given at 28 weeks and pending PP dose depending on  blood type   Recent Labs   Lab Test  18   0900  18   1015   HEPBANG   --   Nonreactive   HIAGAB   --   Nonreactive   GBS  Negative   --    RUQIGG   --   11         Prior to Admission Medications   Prior to Admission Medications   Prescriptions Last Dose Informant Patient Reported? Taking?   Acetaminophen (TYLENOL PO) Past Month at Unknown time  Yes Yes   Sig: Take 650 mg by mouth every 4 hours as needed for mild pain or fever (migraines)   Prenatal Vit-Fe Fumarate-FA (PRENATAL VITAMINS) 28-0.8 MG TABS Past Week at Unknown time  Yes Yes   Sig: Take 1 tablet by mouth daily    valACYclovir (VALTREX) 1000 mg tablet Past Month at Unknown time  No Yes   Sig: TAKE 2 TABLETS BY MOUTH TWICE DAILY FOR 1 DAY      Facility-Administered Medications: None     Allergies   Allergies   Allergen Reactions     Seasonal Allergies          Immunization History   Immunization History   Administered Date(s) Administered     DTAP-IPV/HIB  (PENTACEL) 1983, 1983, 1983     DTaP, Unspecified 10/05/1984, 1988     Hep B, Peds or Adolescent 1996, 1996, 1997     HepB 1996, 1997     Influenza Vaccine IM 3yrs+ 4 Valent IIV4 2016, 2018     MMR 1984, 1994     Rhogam 2015, 2016, 2018     TD (ADULT, 7+) 1996     TDAP Vaccine (Adacel) 2016, 2018       Past Medical History:   Diagnosis Date     Asthma     exercise induced     Cold sore 2016     HSV-1 (herpes simplex virus 1) infection      Migraines     historical, now w/aura this pregnancy     Miscarriage        Past Surgical History:   Procedure Laterality Date      SECTION N/A 2016    Procedure:  SECTION;  Surgeon: Debby Sen MD;  Location: [facility]       Clinic vitals from today:  /70     Abdomen: gravid, single vertex fetus, non-tender, EFW 7 lbs 8    Constitutional: healthy, alert, active and no distress   Extremities: NT, no edema  Neurologic: Awake, alert, oriented x3  Neuropsychiatric: General: normal, calm and normal eye contact  Heart: Regular rate and rhythm  Lungs: clear to ausculation bilaterally    Debby Sen

## 2018-12-07 NOTE — PLAN OF CARE
Pt oriented to room and call light. Questions/concerns answered. VSS and fundus remains firm. Pressure dressing in place, clean, dry and intact. Denies any pain, receiving scheduled pain medications per orders. Pt dizzy at times, denies nausea. Tolerating ice and clear liquids. Breastfeeding infant well. Pt ambulated to bathroom around 1400 with assistance, denies any dizziness. Tolerated well, jennifer care performed. Will continue to monitor.

## 2018-12-07 NOTE — ANESTHESIA CARE TRANSFER NOTE
Patient: Sherie Young    Procedure(s):  REPEAT  SECTION    Diagnosis: PREVIOUS, REQUESTING REPEAT  Diagnosis Additional Information: No value filed.    Anesthesia Type:   Spinal     Note:  Airway :Room Air  Patient transferred to:PACU  Comments: Transferred to PACU, spontaneous respirations, sats 96% on room air.  All monitors and alarms on and functioning, VSS.  Patient awake, comfortable.  Report to PACU RN.Handoff Report: Identifed the Patient, Identified the Reponsible Provider, Reviewed the pertinent medical history, Discussed the surgical course, Reviewed Intra-OP anesthesia mangement and issues during anesthesia, Set expectations for post-procedure period and Allowed opportunity for questions and acknowledgement of understanding      Vitals: (Last set prior to Anesthesia Care Transfer)    CRNA VITALS  2018 0758 - 2018 0838      2018             Resp Rate (set): 10                Electronically Signed By: JEMIMA Conley CRNA  2018  8:38 AM

## 2018-12-08 LAB — HGB BLD-MCNC: 10.1 G/DL (ref 11.7–15.7)

## 2018-12-08 PROCEDURE — 12000037 ZZH R&B POSTPARTUM INTERMEDIATE

## 2018-12-08 PROCEDURE — 25000132 ZZH RX MED GY IP 250 OP 250 PS 637: Performed by: OBSTETRICS & GYNECOLOGY

## 2018-12-08 PROCEDURE — 85018 HEMOGLOBIN: CPT | Performed by: OBSTETRICS & GYNECOLOGY

## 2018-12-08 PROCEDURE — 36415 COLL VENOUS BLD VENIPUNCTURE: CPT | Performed by: OBSTETRICS & GYNECOLOGY

## 2018-12-08 PROCEDURE — 25000128 H RX IP 250 OP 636: Performed by: OBSTETRICS & GYNECOLOGY

## 2018-12-08 RX ADMIN — IBUPROFEN 800 MG: 400 TABLET ORAL at 09:09

## 2018-12-08 RX ADMIN — OXYCODONE HYDROCHLORIDE 5 MG: 5 TABLET ORAL at 09:09

## 2018-12-08 RX ADMIN — IBUPROFEN 800 MG: 400 TABLET ORAL at 21:18

## 2018-12-08 RX ADMIN — ACETAMINOPHEN 975 MG: 325 TABLET, FILM COATED ORAL at 06:09

## 2018-12-08 RX ADMIN — PRENATAL VIT W/ FE FUMARATE-FA TAB 27-0.8 MG 1 TABLET: 27-0.8 TAB at 09:09

## 2018-12-08 RX ADMIN — SENNOSIDES AND DOCUSATE SODIUM 1 TABLET: 8.6; 5 TABLET ORAL at 21:18

## 2018-12-08 RX ADMIN — OXYCODONE HYDROCHLORIDE 5 MG: 5 TABLET ORAL at 17:04

## 2018-12-08 RX ADMIN — IBUPROFEN 800 MG: 400 TABLET ORAL at 14:34

## 2018-12-08 RX ADMIN — OXYCODONE HYDROCHLORIDE 5 MG: 5 TABLET ORAL at 13:26

## 2018-12-08 RX ADMIN — OXYCODONE HYDROCHLORIDE 5 MG: 5 TABLET ORAL at 21:24

## 2018-12-08 RX ADMIN — ACETAMINOPHEN 975 MG: 325 TABLET, FILM COATED ORAL at 21:18

## 2018-12-08 RX ADMIN — ACETAMINOPHEN 975 MG: 325 TABLET, FILM COATED ORAL at 14:34

## 2018-12-08 RX ADMIN — KETOROLAC TROMETHAMINE 30 MG: 30 INJECTION, SOLUTION INTRAMUSCULAR at 03:09

## 2018-12-08 RX ADMIN — SENNOSIDES AND DOCUSATE SODIUM 1 TABLET: 8.6; 5 TABLET ORAL at 09:09

## 2018-12-08 NOTE — PLAN OF CARE
Problem: Patient Care Overview  Goal: Plan of Care/Patient Progress Review  Outcome: No Change  VSS on RA. Tylenol and IV toradol with good pain control. Fundus firm, U/1, dressing CDI. Tejeda patent, good output. Up with SBA, steady. Tolerating regular diet. Bonding well with infant, using  nursery between feedings at night. Working on breast feeding, infant sleepy. Encouraged to call with questions and concerns.

## 2018-12-08 NOTE — PLAN OF CARE
Pt complaining of some incisional discomfort, medicated with oral pain medications per orders. Pt breastfeeding infant well, latch on verified. Using lanolin on sore nipples and will start pumping if infant doesn't feed well due to history of low milk supply. Pt showered this AM, tolerated well. Ambulating in hallways and room independently. Pt using abdominal binder. VSS. Incision remains CDI with steri strips.  remains supportive at bedside, will continue to monitor.

## 2018-12-08 NOTE — PROGRESS NOTES
S: Pt doing well. Infant is being . Pain is well controlled.    O: /61  Pulse 65  Temp 98.6  F (37  C) (Oral)  Resp 18  LMP 03/07/2018 (Exact Date)  SpO2 97%  Breastfeeding? Unknown        ABD:  Uterine fundus is firm, non-tender and at the level of the umbilicus       INC: clean/dry/intact                Hemoglobin   Date Value Ref Range Status   12/07/2018 10.7 (L) 11.7 - 15.7 g/dL Final            Lab Results   Component Value Date    RH Neg 12/07/2018       A: Post-op Day #1 s/p C/Section    P: Continue Post Op Cares

## 2018-12-08 NOTE — PLAN OF CARE
Problem: Patient Care Overview  Goal: Plan of Care/Patient Progress Review  Outcome: No Change  DOS: VSS, pain well controlled. Tolerated diet and activity. Tejeda patent and adequate UO. Dressing intact. Fundus firm with mild flow. SBA with activity. Continue to monitor.

## 2018-12-08 NOTE — LACTATION NOTE
Follow up visit. Observed good latch and rhythmic suckle- answered questions and reassured. N Day RN IBLC

## 2018-12-09 PROCEDURE — 12000037 ZZH R&B POSTPARTUM INTERMEDIATE

## 2018-12-09 PROCEDURE — 25000132 ZZH RX MED GY IP 250 OP 250 PS 637: Performed by: OBSTETRICS & GYNECOLOGY

## 2018-12-09 RX ADMIN — OXYCODONE HYDROCHLORIDE 5 MG: 5 TABLET ORAL at 09:11

## 2018-12-09 RX ADMIN — IBUPROFEN 800 MG: 400 TABLET ORAL at 03:18

## 2018-12-09 RX ADMIN — ACETAMINOPHEN 975 MG: 325 TABLET, FILM COATED ORAL at 23:45

## 2018-12-09 RX ADMIN — SENNOSIDES AND DOCUSATE SODIUM 2 TABLET: 8.6; 5 TABLET ORAL at 20:11

## 2018-12-09 RX ADMIN — ACETAMINOPHEN 975 MG: 325 TABLET, FILM COATED ORAL at 06:09

## 2018-12-09 RX ADMIN — OXYCODONE HYDROCHLORIDE 5 MG: 5 TABLET ORAL at 12:32

## 2018-12-09 RX ADMIN — SENNOSIDES AND DOCUSATE SODIUM 2 TABLET: 8.6; 5 TABLET ORAL at 09:11

## 2018-12-09 RX ADMIN — PRENATAL VIT W/ FE FUMARATE-FA TAB 27-0.8 MG 1 TABLET: 27-0.8 TAB at 09:11

## 2018-12-09 RX ADMIN — ACETAMINOPHEN 975 MG: 325 TABLET, FILM COATED ORAL at 14:06

## 2018-12-09 RX ADMIN — IBUPROFEN 800 MG: 400 TABLET ORAL at 09:11

## 2018-12-09 RX ADMIN — OXYCODONE HYDROCHLORIDE 5 MG: 5 TABLET ORAL at 17:13

## 2018-12-09 RX ADMIN — OXYCODONE HYDROCHLORIDE 5 MG: 5 TABLET ORAL at 01:50

## 2018-12-09 RX ADMIN — OXYCODONE HYDROCHLORIDE 5 MG: 5 TABLET ORAL at 06:09

## 2018-12-09 RX ADMIN — OXYCODONE HYDROCHLORIDE 5 MG: 5 TABLET ORAL at 23:46

## 2018-12-09 RX ADMIN — OXYCODONE HYDROCHLORIDE 5 MG: 5 TABLET ORAL at 20:11

## 2018-12-09 RX ADMIN — IBUPROFEN 800 MG: 400 TABLET ORAL at 23:45

## 2018-12-09 RX ADMIN — IBUPROFEN 800 MG: 400 TABLET ORAL at 17:12

## 2018-12-09 NOTE — PROGRESS NOTES
The EMR was down for 6 hours on 12/9/2018.    Dodie Womack RN was responsible for completing the paper charting during this time period.     The following information was re-entered into the system by Lynne Spencer: MAR and Vital Signs    The following information will remain in the paper chart: Assessment, flowsheets, cares, and notes    Lynne Spencer  12/9/2018

## 2018-12-09 NOTE — PLAN OF CARE
Problem: Patient Care Overview  Goal: Plan of Care/Patient Progress Review  Outcome: Improving  VSS.  Incision C/D/I.  Up to bathroom and walking in halls independently..  Incisional pain well controlled with oxycodone, Tylenol and ibuprofen. Gely reg diet. Voiding. Breastfeeding  going well.

## 2018-12-09 NOTE — OP NOTE
Procedure Date: 2018      PREOPERATIVE DIAGNOSIS:  Intrauterine pregnancy at 39+2 with previous  section, requesting repeat.      POSTOPERATIVE DIAGNOSIS:  Intrauterine pregnancy at 39+2 with previous  section, requesting repeat.      PROCEDURE:  Repeat low transverse  section.      SURGEON: Debby Sen MD      ASSISTANT:  None.        ANESTHESIA:  Spinal.      ESTIMATED BLOOD LOSS:  700 mL.      SPECIMENS:  None.      OPERATIVE INDICATIONS:  Sherie is a 33-year-old,  3, para 1-0-1-1, whose pregnancy was followed by myself.  The patient's blood type A negative.  She is rubella immune.  She is group B strep negative.  The patient did receive RhoGAM at 28 weeks for her Rh negative blood type.  She also received Tdap and flu shots during her pregnancy.  Because of the advanced maternal age, the patient did per progenity testing and this was normal along with a normal AFP.  She did not do any other genetic testing.  She passed her 1-hour GCT at 88.  With her first pregnancy, she had arrest of descent and cephalopelvic disproportion and had a  section after trying labor and with this pregnancy, we decided that we would do scheduled  section which was scheduled for today.      OPERATIVE FINDINGS:  There was delivery of a viable female infant in the left occiput anterior position at 7:38 a.m.  Apgars were 9 and 9 at 1 and 5 minutes respectively and her weight was 7 pounds 7 ounces.  The placenta was normal with a normal 3-vessel cord.  The uterus, tubes and ovaries were normal.  The amniotic fluid was clear.  There were no adhesions noted.      PROCEDURE IN DETAIL:  The patient was taken to the operating room where spinal anesthesia was obtained.  The patient was placed in dorsal supine position with leftward tilt and prepped and draped in the normal fashion.  A timeout was undertaken and anesthesia was tested and found to be adequate.  Pfannenstiel skin incision was  made overlying her previous surgical scar and this was done with the scalpel.  The subcutaneous tissues were then taken down with cautery and the fascia nicked on either side of midline.  The fascial incision was then extended superolaterally with Clarke scissors.  Two Kocher clamps were then used to elevate the fascia off of the underlying rectus muscles, and the two were  from each other with Clarke scissors and cautery.  This was done both superiorly and inferiorly.  The mid position of the rectus muscles was then identified and  with a Taylor and cautery.  Once the rectus muscles were ,  the peritoneum was entered.  The peritoneal incision was extended with Metzenbaum scissors as well as blunt pull technique and a bladder blade was placed.  Vesicouterine peritoneal reflection was then identified and incised with Metzenbaum scissors.  Blunt finger dissection was used to create a bladder flap and this was incorporated into the bladder blade.  A transverse incision was then made in the lower uterine segment.  Upon entry into the uterine cavity, blunt finger dissection was used to extend the uterine incision.  A hand was then placed over the vertex of the infant.  With steady fundal pressure, the infant's head was delivered through the incision.  The remainder of the infant was then delivered easily and atraumatically.  The infant was bulb suctioned while the umbilical cord clamping was delayed for 1 minute and then the cord was doubly clamped and cut and the infant handed off to the awaiting nursing team.  The placenta was then manually extracted and the uterus exteriorized.  The interior of the uterus was wiped clean of all clots and debris.  Uterine incision was then closed with a running locked 0 Vicryl stitch from left corner through to the right.  A second layer closure was then done, again using 0 Vicryl with a horizontal imbricating stitch from the left corner through to the right.   The uterine incision was then found to be completely hemostatic and the uterus replaced into the pelvis.  The pelvis was then copiously irrigated and cleaned of all clots and debris.  The uterine incision was reinspected and found to be hemostatic.  The peritoneum was then closed with a running 3-0 Vicryl stitch from the superior aspect to the inferior.  The rectus muscles were then carefully inspected and the underside of the fascia as well.  Some areas of oozing were cauterized.  The fascia was then closed with a running 0 Vicryl stitch from left corner through to the right.  Subcutaneous tissues were then irrigated and the areas of oozing were cauterized.  The skin was then closed with 4-0 Monocryl in a subcuticular fashion.      The patient tolerated the procedure very well and there were no complications.  All sponge, lap and instrument counts were correct x2.      DRAINS:  Tejeda catheter.      DISPOSITION:  The patient was taken to postanesthesia recovery room in stable condition along with her  infant.         SONIDO CISNEROS MD             D: 2018   T: 2018   MT: CAITLIN      Name:     FLOR JIMENEZ   MRN:      [id number]        Account:        [id number]   :      1985           Procedure Date: 2018      Document: [id number]

## 2018-12-09 NOTE — PLAN OF CARE
Vital signs stable,voiding with out difficulty,pain control with tylenol,ibuprofen&oxycodone,incision clean&dry,independent with self&baby cares,baby breast feeding well,pumping supplementing ebm finger feeding.

## 2018-12-09 NOTE — PROGRESS NOTES
S: Pt doing well. Infant is being . Pain is well controlled.    O: /80   Pulse 55   Temp 97.6  F (36.4  C) (Oral)   Resp 18   LMP 03/07/2018 (Exact Date)   SpO2 97%   Breastfeeding? Unknown         ABD:  Uterine fundus is firm, non-tender and at the level of the umbilicus       INC: clean/dry/intact                Hemoglobin   Date Value Ref Range Status   12/08/2018 10.1 (L) 11.7 - 15.7 g/dL Final            Lab Results   Component Value Date    RH Neg 12/07/2018       A: Post-op Day #2 s/p C/Section    P: Continue Post Op Cares

## 2018-12-10 VITALS
HEART RATE: 55 BPM | SYSTOLIC BLOOD PRESSURE: 141 MMHG | TEMPERATURE: 98.3 F | OXYGEN SATURATION: 97 % | DIASTOLIC BLOOD PRESSURE: 91 MMHG | RESPIRATION RATE: 16 BRPM

## 2018-12-10 PROCEDURE — 25000132 ZZH RX MED GY IP 250 OP 250 PS 637: Performed by: OBSTETRICS & GYNECOLOGY

## 2018-12-10 RX ORDER — OXYCODONE HYDROCHLORIDE 5 MG/1
5-10 TABLET ORAL
Qty: 20 TABLET | Refills: 0 | Status: SHIPPED | OUTPATIENT
Start: 2018-12-10 | End: 2018-12-13

## 2018-12-10 RX ORDER — IBUPROFEN 800 MG/1
800 TABLET, FILM COATED ORAL EVERY 6 HOURS PRN
Qty: 40 TABLET | Refills: 0 | Status: SHIPPED | OUTPATIENT
Start: 2018-12-10 | End: 2020-02-12

## 2018-12-10 RX ORDER — AMOXICILLIN 250 MG
1 CAPSULE ORAL DAILY
Qty: 60 TABLET | Refills: 0 | Status: SHIPPED | OUTPATIENT
Start: 2018-12-10 | End: 2020-02-12

## 2018-12-10 RX ADMIN — SENNOSIDES AND DOCUSATE SODIUM 1 TABLET: 8.6; 5 TABLET ORAL at 07:17

## 2018-12-10 RX ADMIN — OXYCODONE HYDROCHLORIDE 5 MG: 5 TABLET ORAL at 03:20

## 2018-12-10 RX ADMIN — OXYCODONE HYDROCHLORIDE 5 MG: 5 TABLET ORAL at 06:38

## 2018-12-10 RX ADMIN — PRENATAL VIT W/ FE FUMARATE-FA TAB 27-0.8 MG 1 TABLET: 27-0.8 TAB at 07:17

## 2018-12-10 RX ADMIN — ACETAMINOPHEN 975 MG: 325 TABLET, FILM COATED ORAL at 07:17

## 2018-12-10 RX ADMIN — OXYCODONE HYDROCHLORIDE 5 MG: 5 TABLET ORAL at 11:03

## 2018-12-10 RX ADMIN — IBUPROFEN 800 MG: 400 TABLET ORAL at 06:37

## 2018-12-10 NOTE — PLAN OF CARE
Pt's pain controlled by Oxycodone/Ibuprofen/Tylenol. Up in room and hallways. Abdominal binder on. Going home today with .

## 2018-12-10 NOTE — LACTATION NOTE
Routine visit with Sherie, FOB and baby.  Baby latched on well to the left breast and swallows heard.  Sherie pumping and yielding 10ml.  Plan is to breast feed and pump.  No further questions at this time. Getting ready for discharge.  Plan: Watch for feeding cues and feed every 2-3 hours and/or on demand. Continue to use feeding log to track intake and appropriate voids and stools. Take feeding log to first follow up appointment or weight check. Encourage skin to skin to promote frequent feedings, thermoregulation and bonding. Follow-up with healthcare provider or lactation consultant for questions or concerns.    Analisa Bartlett BSN, RN, PHN, RNC-MNN, IBCLC

## 2018-12-10 NOTE — PLAN OF CARE
VSS. Voiding without difficulty. Using oxycodone/tylenol/ibuprofen for pain management. Up ambulating free of dizziness. Working on breastfeeding every 2-3 hours. Encouraged to call with questions/concerns. Will continue to monitor.

## 2018-12-10 NOTE — PLAN OF CARE
Vital signs stable. Fundus firm, U/1, bleeding scant, no clots. Incision well approximated with steri-strips. Up independently to bathroom, voiding without difficulty. Taking Tylenol/ibuprofen/oxycodone for pain. Using abdominal binder. Working on breastfeeding infant, independent with  cares. Pumping after breastfeeding, getting 2-4cc colostrum. Will continue to monitor and notify MD as needed.

## 2018-12-10 NOTE — PROGRESS NOTES
S: Pt doing well. Infant is being . Pain is controlled with current analgesics.  Medication(s) being used: acetaminophen, ibuprofen (OTC) and narcotic analgesics including oxycodone. + BM x2. No n/v.    O: /78   Pulse 55   Temp 97.7  F (36.5  C) (Oral)   Resp 16   LMP 03/07/2018 (Exact Date)   SpO2 97%   Breastfeeding? Unknown         ABD:  Uterine fundus is firm, non-tender and at the level of the umbilicus, has a rash from ioband drape on upper abd-mild  Incision c/d/i                Hemoglobin   Date Value Ref Range Status   12/08/2018 10.1 (L) 11.7 - 15.7 g/dL Final            Lab Results   Component Value Date    RH Neg 12/07/2018       A:  POD#3 s/p repeat c/s    P: Continue Postop cares  discharge home and f/u in 6 weeks

## 2018-12-20 ENCOUNTER — TELEPHONE (OUTPATIENT)
Dept: OBGYN | Facility: CLINIC | Age: 33
End: 2018-12-20

## 2018-12-20 NOTE — TELEPHONE ENCOUNTER
C/section 12/7/18. Pt has a bad cold. Congested cough. No fever. Sore throat. No wheezing. Body aches, nasal congestion. Pt advised she can take Robitussin DM or mucinex plain. Can also try Vicks, saline nasal rinse, cough drops. Pt encourage to go to Urgent care if not better in day or two.

## 2018-12-26 ENCOUNTER — DOCUMENTATION ONLY (OUTPATIENT)
Dept: OBGYN | Facility: CLINIC | Age: 33
End: 2018-12-26

## 2018-12-26 NOTE — PROGRESS NOTES
[facility] will be sharing updates with you on Maternal Child Health Referral requests for home care services.  This is for care coordination purposes and alert you to referral status.  We received the referral for  Sherie Young; MRN [id number] and want to update you:    [facility] has made 2 attempts to contact patient by phone and text message over the last 4 days.   We have not had any response from patient.  Final message was left advising patient to follow up with Primary Care Providers for mom and baby.      [facility]  [facility]  [phone]

## 2018-12-27 NOTE — DISCHARGE SUMMARY
Admit Date:     2018   Discharge Date:     12/10/2018      DATE OF ADMISSION:  2018      DATE OF DISCHARGE:  12/10/2018      HISTORY OF PRESENT ILLNESS:  Sherie is a 33-year-old,  3, para 1-0-1-1, whose pregnancy was followed by myself and was uncomplicated, other than advanced maternal age.  The patient did have normal genetic screening done.  She also had a normal AFP.  She passed her 1-hour GCT and received both Tdap and flu shots during her pregnancy.  She also is Rh negative and did receive RhoGAM as appropriate.  The patient had a  section at 40 weeks with her first son for arrest of descent, cephalopelvic disproportion and occiput posterior position.  With this pregnancy, decision was made to proceed with repeat  section.  Other than this, she had no complications.      The patient was taken to the operating room on 2018 at which time she was 39 weeks 2 days and underwent a repeat  section without complications.  Her estimated blood loss was 700 mL.  She delivered a viable female infant with Apgars of 9 and 9 at 1 and 5 minutes respectively and a weight of 7 pounds 7 ounces.  There were no complications and both mother and infant did extremely well.      Postoperatively, the patient did great.  Her pain was initially controlled with the Duramorph in her spinal, as well as IV Toradol and oral Tylenol.  She was eventually transitioned over to oral oxycodone, ibuprofen and Tylenol with excellent pain control.  The patient was ambulating well and was able to void upon removal of her Tejeda catheter.  She was tolerating a regular diet without nausea or vomiting.  The patient was breastfeeding and this was going well.  Her preoperative hemoglobin was 10.7 and her postoperative hemoglobin was 10.1 with an appropriate blood loss anemia on top of chronic anemia of pregnancy.  Her vital signs remained stable throughout her hospitalization and on postoperative day #3, the  patient was doing well and felt ready to be discharged to home.      DISCHARGE INSTRUCTIONS:  The patient was discharged with instructions on no lifting greater than 10-15 pounds for 6 weeks, no driving for 1-2 weeks or while on narcotics and pelvic rest for 6 weeks.  She is to follow up in 6 weeks' time for her postoperative checkup.      DISCHARGE MEDICATIONS:  Oxycodone 1-2 tabs p.o. q. 4-6h. p.r.n. and Senokot-S 1-2 tabs p.o. every day p.r.n.         SONIDO CISNEROS MD             D: 2018   T: 2018   MT: FUNMILAYO      Name:     FLOR JIMENEZ   MRN:      [id number]        Account:        [id number]   :      1985           Admit Date:     2018                                  Discharge Date: 12/10/2018      Document: [id number]

## 2019-01-08 DIAGNOSIS — B00.1 COLD SORE: ICD-10-CM

## 2019-01-08 RX ORDER — VALACYCLOVIR HYDROCHLORIDE 1 G/1
TABLET, FILM COATED ORAL
Qty: 12 TABLET | Refills: 0 | Status: SHIPPED | OUTPATIENT
Start: 2019-01-08 | End: 2019-03-29

## 2019-01-08 NOTE — TELEPHONE ENCOUNTER
"Requested Prescriptions   Pending Prescriptions Disp Refills     valACYclovir (VALTREX) 1000 mg tablet [Pharmacy Med Name: VALACYCLOVIR 1GM TABLETS] 12 tablet 0     Sig: TAKE 2 TABLETS BY MOUTH TWICE DAILY FOR 1 DAY    Antivirals for Herpes Protocol Failed - 1/8/2019 12:15 PM       Failed - Medication is active on med list       Failed - Normal serum creatinine on file in past 12 months    No lab results found.         Passed - Patient is age 12 or older       Passed - Recent (12 mo) or future (30 days) visit within the authorizing provider's specialty    Patient had office visit in the last 12 months or has a visit in the next 30 days with authorizing provider or within the authorizing provider's specialty.  See \"Patient Info\" tab in inbasket, or \"Choose Columns\" in Meds & Orders section of the refill encounter.              Next 5 appointments (look out 90 days)    Jan 18, 2019  1:40 PM CST  Post Partum with Debby Sen MD  [facility] [address] [place] [id number]  [phone]           Prescription approved per [facility] [place].  Rosario Renee RN on 1/8/2019 at 12:25 PM    "

## 2019-01-17 NOTE — PROGRESS NOTES
"  SUBJECTIVE:  Sherie Young,  is here for a postpartum visit.  She had a  Section  on 18 delivering a healthy baby girl, named Lavern weighing 7 lbs 7 4 oz at term.      HPI:  Overall is doing well. The first 2 weeks were rough. The sleep deprivation and pain with breastfeeding. Now is definitely getting better and easier and more used to it. The baby is sleeping better. Yesterday did a 10 to 4am stretch. However is also definitely fussier in the evening/night but knows that's a phase that will pass.    Lochia is done as of week 3-4. Incision is feeling fine. A little numb on the edges but good o/w    Not sure what they want to do for birth control. Thinks she took an rx for birth control last time but never took it. Period came back after 8 months so never really contracepted other than NFP.  Is doing about 50/50 breast and bottle right now b/c can't keep up with breastfeeding alone. Many of her friend have an IUD and love it but it freaks her out    Last PHQ-9 score on record=   PHQ-9 SCORE 2019   PHQ-9 Total Score 2     MEDINA-7 SCORE 2018   Total Score 1 0 0       Delivery complications:  No  Breast feeding:  Yes,   Bladder problems:  No  Bowel problems/hemorrhoids:  No  Episiotomy/laceration/incision healed? Yes: looks good.   Vaginal flow:  None  [place]:  No  Contraception: unsure  Emotional adjustment:  doing well, happy and tired  Back to work:  6 weeks.     12 point review of systems negative other than symptoms noted below.    OBJECTIVE:  Vitals: /70   Ht 1.708 m (5' 7.25\")   Wt 94.3 kg (208 lb)   LMP 2018 (Exact Date)   Breastfeeding? Yes   BMI 32.34 kg/m    BMI= Body mass index is 32.34 kg/m .  General - pleasant female in no acute distress.  Breast -  deferred  Abdomen - Incision well-healed  Pelvic - EG: normal adult female, BUS: within normal limits, Vagina: well rugated, no discharge, SMALL AMOUNT OF DARK RED/BROWN " DISCHARGE Cervix: no lesions or CMT, Uterus: firm, normal sized and nontender, anteverted in position. Adnexae: no masses or tenderness.  Rectovaginal - deferred.    ASSESSMENT:    ICD-10-CM    1. Routine postpartum follow-up Z39.2    2. OCP (oral contraceptive pills) initiation Z30.011 norethindrone (MICRONOR) 0.35 MG tablet       PLAN:  May resume normal activities without restrictions.  Pap smear was not done today. Pap was NIL/HPV neg 2/18 so UTD for 4 more year    Rx given for minipill for now. Will think about mirena IUD b/c discussed the pros/cons and addressed her concerns and why it worries her and she may be interested.    Full counseling was provided, and all questions were answered.   Return to clinic in one year for an annual visit.     Debby Sen MD

## 2019-01-18 ENCOUNTER — PRENATAL OFFICE VISIT (OUTPATIENT)
Dept: OBGYN | Facility: CLINIC | Age: 34
End: 2019-01-18
Payer: COMMERCIAL

## 2019-01-18 VITALS
BODY MASS INDEX: 32.65 KG/M2 | HEIGHT: 67 IN | DIASTOLIC BLOOD PRESSURE: 70 MMHG | SYSTOLIC BLOOD PRESSURE: 110 MMHG | WEIGHT: 208 LBS

## 2019-01-18 DIAGNOSIS — Z30.011 OCP (ORAL CONTRACEPTIVE PILLS) INITIATION: ICD-10-CM

## 2019-01-18 PROCEDURE — 99207 ZZC POST PARTUM EXAM: CPT | Performed by: OBSTETRICS & GYNECOLOGY

## 2019-01-18 RX ORDER — ACETAMINOPHEN AND CODEINE PHOSPHATE 120; 12 MG/5ML; MG/5ML
0.35 SOLUTION ORAL DAILY
Qty: 84 TABLET | Refills: 4 | Status: SHIPPED | OUTPATIENT
Start: 2019-01-18 | End: 2019-05-31

## 2019-01-18 ASSESSMENT — PATIENT HEALTH QUESTIONNAIRE - PHQ9
5. POOR APPETITE OR OVEREATING: NOT AT ALL
SUM OF ALL RESPONSES TO PHQ QUESTIONS 1-9: 2

## 2019-01-18 ASSESSMENT — ANXIETY QUESTIONNAIRES
3. WORRYING TOO MUCH ABOUT DIFFERENT THINGS: NOT AT ALL
GAD7 TOTAL SCORE: 0
5. BEING SO RESTLESS THAT IT IS HARD TO SIT STILL: NOT AT ALL
2. NOT BEING ABLE TO STOP OR CONTROL WORRYING: NOT AT ALL
6. BECOMING EASILY ANNOYED OR IRRITABLE: NOT AT ALL
1. FEELING NERVOUS, ANXIOUS, OR ON EDGE: NOT AT ALL
7. FEELING AFRAID AS IF SOMETHING AWFUL MIGHT HAPPEN: NOT AT ALL

## 2019-01-18 ASSESSMENT — MIFFLIN-ST. JEOR: SCORE: 1675.07

## 2019-01-19 PROBLEM — O09.529 SUPERVISION OF HIGH-RISK PREGNANCY OF ELDERLY MULTIGRAVIDA: Status: RESOLVED | Noted: 2018-05-08 | Resolved: 2019-01-19

## 2019-01-19 PROBLEM — Z34.90 PREGNANCY: Status: RESOLVED | Noted: 2018-12-07 | Resolved: 2019-01-19

## 2019-01-19 ASSESSMENT — ANXIETY QUESTIONNAIRES: GAD7 TOTAL SCORE: 0

## 2019-03-07 ENCOUNTER — TELEPHONE (OUTPATIENT)
Dept: OBGYN | Facility: CLINIC | Age: 34
End: 2019-03-07

## 2019-03-07 NOTE — LETTER
March 7, 2019      Sherie Young  [address]  [place] [address]        To Whom It May Concern:    Sherie Young was seen in our clinic. She may return to  work without restrictions.      Sincerely,        Debby Sen MD

## 2019-03-07 NOTE — TELEPHONE ENCOUNTER
Pt calling to get a letter for work- returning after having c/s-  Pt had PP check in January.    Completed

## 2019-03-29 DIAGNOSIS — B00.1 COLD SORE: ICD-10-CM

## 2019-03-29 RX ORDER — VALACYCLOVIR HYDROCHLORIDE 1 G/1
TABLET, FILM COATED ORAL
Qty: 12 TABLET | Refills: 0 | Status: SHIPPED | OUTPATIENT
Start: 2019-03-29 | End: 2019-10-17

## 2019-05-30 ENCOUNTER — TELEPHONE (OUTPATIENT)
Dept: OBGYN | Facility: CLINIC | Age: 34
End: 2019-05-30
Payer: COMMERCIAL

## 2019-05-30 DIAGNOSIS — Z53.9 ERRONEOUS ENCOUNTER--DISREGARD: Primary | ICD-10-CM

## 2019-05-30 DIAGNOSIS — Z30.011 OCP (ORAL CONTRACEPTIVE PILLS) INITIATION: ICD-10-CM

## 2019-05-30 NOTE — TELEPHONE ENCOUNTER
This encounter was opened in error. Please disregard. Received paper Rx refill for birth control pills. She should still have some left since she was given a year's worth in Jan 2019. Will contact pharmacy to cancel request.

## 2019-05-30 NOTE — TELEPHONE ENCOUNTER
Attempted to contact patient regarding confirmation on where patient wants Rx to be filled. Back in January, we sent it to a [facility] in [place], while the paper Rx request was from [facility].    Will await call back to know what further action is needed.

## 2019-05-31 DIAGNOSIS — Z30.011 OCP (ORAL CONTRACEPTIVE PILLS) INITIATION: ICD-10-CM

## 2019-05-31 RX ORDER — ACETAMINOPHEN AND CODEINE PHOSPHATE 120; 12 MG/5ML; MG/5ML
0.35 SOLUTION ORAL DAILY
Qty: 84 TABLET | Refills: 2 | Status: SHIPPED | OUTPATIENT
Start: 2019-05-31 | End: 2020-01-20

## 2019-05-31 NOTE — TELEPHONE ENCOUNTER
Patient calling back. [facility] will only fill 1 month supply at a time so wants script sent to [facility].

## 2019-05-31 NOTE — TELEPHONE ENCOUNTER
"Requested Prescriptions   Pending Prescriptions Disp Refills     norethindrone (MICRONOR) 0.35 MG tablet 84 tablet 4     Sig: Take 1 tablet (0.35 mg) by mouth daily       Contraceptives Protocol Failed - 5/31/2019  2:50 PM        Failed - Recent (12 mo) or future (30 days) visit within the authorizing provider's specialty     Patient had office visit in the last 12 months or has a visit in the next 30 days with authorizing provider or within the authorizing provider's specialty.  See \"Patient Info\" tab in inbasket, or \"Choose Columns\" in Meds & Orders section of the refill encounter.              Passed - Patient is not a current smoker if age is 35 or older        Passed - Medication is active on med list        Passed - No active pregnancy on record        Passed - No positive pregnancy test in past 12 months        Last Written Prescription Date:  01/18/19  Last Fill Quantity: 84,  # refills: 4   Last office visit: 1/18/2019 with prescribing provider:  Dr Sen for yearly   Future Office Visit:  None    Per pt, she wants to switch her pharmacy to [facility]. She had her Rx refilled in Jan 2019. Please send new prescription to new pharmacy and call [facility] in [place] [phone] to cancel refills.    "

## 2019-07-05 NOTE — PROGRESS NOTES
[place] this [place] [facility] [place] [place] [place] and walking and going out to eat  Did compression hose so swelling wasn't bad while on trip or not  Getting a lot of right groin pain and pubic bone pain after sitting for awhile  Tons of FM  Some tigthening but nothing concerning  FH is 2 cm larger again. Staying consistently like that. Plan growth U/S in 2 weeks when rtns  GBS and cvx check at 36w  
Yes

## 2019-08-26 ENCOUNTER — TELEPHONE (OUTPATIENT)
Dept: OBGYN | Facility: CLINIC | Age: 34
End: 2019-08-26

## 2019-08-26 NOTE — TELEPHONE ENCOUNTER
"Clinic Action Needed:Yes, patient would like message routed to MD and for her to call her in the am 8/27 at [phone].  Reason for Call:\"I have been taking  norethindrone (MICRONOR) 0.35 MG tablet 84 tablet 2 5/31/2019  No   Sig - Route: Take 1 tablet (0.35 mg) by mouth daily - Oral     Since the end of May. I do not always take them the same time of day and I have missed one here and there. But I am getting a period every 2 weeks. Can you have my OB doctor call me in the morning?\"   Patient Recommendations/Teaching:Denies triage, will route message  Routed to:PCP  Shanice Blackwell RN [facility]          "

## 2019-08-26 NOTE — LETTER
Sherie,       We were unable to reach you by telephone regarding your question about your birth control.  Please feel free to call triage at [phone] or send a [employer] message with any questions or concerns.    Take care,   Rosario ELDER, Triage on behalf of Dr. Sen

## 2019-09-29 ENCOUNTER — HEALTH MAINTENANCE LETTER (OUTPATIENT)
Age: 34
End: 2019-09-29

## 2019-10-17 DIAGNOSIS — B00.1 COLD SORE: ICD-10-CM

## 2019-10-17 RX ORDER — VALACYCLOVIR HYDROCHLORIDE 1 G/1
TABLET, FILM COATED ORAL
Qty: 12 TABLET | Refills: 0 | Status: SHIPPED | OUTPATIENT
Start: 2019-10-17 | End: 2019-12-27

## 2019-10-17 NOTE — TELEPHONE ENCOUNTER
"Requested Prescriptions   Pending Prescriptions Disp Refills     valACYclovir (VALTREX) 1000 mg tablet [Pharmacy Med Name: VALACYCLOVIR 1GM TABLETS] 12 tablet 0     Sig: TAKE 2 TABLETS BY MOUTH TWICE DAILY FOR 1 DAY       Antivirals for Herpes Protocol Failed - 10/17/2019 10:18 AM        Failed - Recent (12 mo) or future (30 days) visit within the authorizing provider's specialty     Patient has had an office visit with the authorizing provider or a provider within the authorizing providers department within the previous 12 mos or has a future within next 30 days. See \"Patient Info\" tab in inbasket, or \"Choose Columns\" in Meds & Orders section of the refill encounter.              Failed - Normal serum creatinine on file in past 12 months     No lab results found.          Passed - Patient is age 12 or older        Passed - Medication is active on med list        Requested Prescriptions   Prescription approved per [facility] [place].    "

## 2019-10-29 ENCOUNTER — TELEPHONE (OUTPATIENT)
Dept: OBGYN | Facility: CLINIC | Age: 34
End: 2019-10-29

## 2019-10-29 ENCOUNTER — NURSE TRIAGE (OUTPATIENT)
Dept: NURSING | Facility: CLINIC | Age: 34
End: 2019-10-29

## 2019-10-29 NOTE — TELEPHONE ENCOUNTER
Pt calling to see if pcp Dr. Sen can write her a prescription for an oral steriod.  Pt sustained a back injury yesterday.  She saw her chiropractor who believes she has a buldging disc and recommends Prednisone.      Pt declined a triage assessment.  Writer strongly encouraged pt to make an clinic appointment.  Pt declined, prefers to have this message sent to pcp to see if there is anything that can be done over the phone.  Pt states if Dr. Sen is unable to do this she can make an appointment or be seen in .      She verbalized understanding and had no further questions.     Pharmacy: [id number] - [place], [place] - [address] AT [facility] & [facility] [address]    Aruna Santos RN/NICO    Reason for Disposition    [1] Caller requests to speak ONLY to PCP AND [2] NON-URGENT question    Additional Information    Negative: Lab calling with strep throat test results and triager can call in prescription    Negative: Lab calling with urinalysis test results and triager can call in prescription    Negative: Medication questions    Negative: ED call to PCP    Negative: Physician call to PCP    Negative: Call about patient who is currently hospitalized    Negative: Lab or radiology calling with CRITICAL test results    Negative: [1] Prescription not at pharmacy AND [2] was prescribed today by PCP    Negative: [1] Follow-up call from patient regarding patient's clinical status AND [2] information urgent    Negative: [1] Caller requests to speak ONLY to PCP AND [2] URGENT question    Negative: [1] Caller requests to speak to PCP now AND [2] won't tell us reason for call  (Exception: if 10 pm to 6 am, caller must first discuss reason for the call)    Negative: Notification of hospital admission    Negative: Notification of death    Negative: Caller requesting lab results    Negative: Lab or radiology calling with test results    Negative: [1] Follow-up call from patient regarding patient's clinical  status AND [2] information NON-URGENT    Protocols used: PCP CALL - NO TRIAGE-A-AH

## 2019-10-29 NOTE — TELEPHONE ENCOUNTER
Clinic Action Needed: Yes, please call patient, leave a detailed VM if no answer.     Presenting Problem: Pt calling to see if pcp Dr. Sen can write her a prescription for an oral steriod.  Pt sustained a back injury yesterday.  She saw her chiropractor who believes she has a buldging disc and recommends Prednisone.       Pt declined a triage assessment.  Writer strongly encouraged pt to make an clinic appointment.  Pt declined, prefers to have this message sent to pcp to see if there is anything that can be done over the phone.  Pt states if Dr. Sen is unable to do this she can make an appointment or be seen in .       Pharmacy: [id number] [place] [address] [address] [facility] [address] [facility] [address]       Routed to: JAEL Santos RN/FNA

## 2019-10-30 NOTE — TELEPHONE ENCOUNTER
Pls see msg from after hours triage.  Pt requesting treatment for possible bulging disc from back injury.    Routing to Dr Sen to advise-  Encourage appt w/  Specialist or UC?

## 2019-10-30 NOTE — TELEPHONE ENCOUNTER
This is outside of my scope of practice. I'd love to just send it in and help her out but I don't do this to know which steroid, what dose, how long, etc.  I think she either needs to go to an urgent care or could do an acute walk in at [facility] or [facility]

## 2019-10-31 NOTE — TELEPHONE ENCOUNTER
Called and left a detailed vm with Dr Sen response below.    Rhea Sarmiento RN on 10/31/2019 at 8:21 AM

## 2019-12-27 DIAGNOSIS — B00.1 COLD SORE: ICD-10-CM

## 2019-12-27 RX ORDER — VALACYCLOVIR HYDROCHLORIDE 1 G/1
TABLET, FILM COATED ORAL
Qty: 12 TABLET | Refills: 0 | Status: SHIPPED | OUTPATIENT
Start: 2019-12-27 | End: 2020-02-12

## 2019-12-27 NOTE — TELEPHONE ENCOUNTER
"Requested Prescriptions   Pending Prescriptions Disp Refills     valACYclovir (VALTREX) 1000 mg tablet [Pharmacy Med Name: VALACYCLOVIR 1GM TABLETS] 12 tablet 0     Sig: TAKE 2 TABLETS BY MOUTH TWICE DAILY FOR 1 DAY       Antivirals for Herpes Protocol Failed - 12/27/2019  3:25 PM        Failed - Recent (12 mo) or future (30 days) visit within the authorizing provider's specialty     Patient has had an office visit with the authorizing provider or a provider within the authorizing providers department within the previous 12 mos or has a future within next 30 days. See \"Patient Info\" tab in inbasket, or \"Choose Columns\" in Meds & Orders section of the refill encounter.              Failed - Normal serum creatinine on file in past 12 months     No lab results found.          Passed - Patient is age 12 or older        Passed - Medication is active on med list        Medication is being filled for 1 time refill only due to:  appointment needed for further refills   Rosario Renee RN on 12/27/2019 at 3:27 PM      "

## 2020-01-20 DIAGNOSIS — Z30.011 OCP (ORAL CONTRACEPTIVE PILLS) INITIATION: ICD-10-CM

## 2020-01-20 RX ORDER — ACETAMINOPHEN AND CODEINE PHOSPHATE 120; 12 MG/5ML; MG/5ML
0.35 SOLUTION ORAL DAILY
Qty: 84 TABLET | Refills: 0 | Status: SHIPPED | OUTPATIENT
Start: 2020-01-20 | End: 2020-02-12

## 2020-01-20 NOTE — PROGRESS NOTES
Refill request in refill error in basket for ocp. Mail order so refilled for 3 mos. Appointment needed for future refills  Rosario Renee RN on 1/20/2020 at 1:28 PM

## 2020-02-11 NOTE — PROGRESS NOTES
Sherie is a 34 year old  female who presents for annual exam.     Besides routine health maintenance,  she would like to discuss lump on her back and on her inner thigh.    HPI:  The patient's PCP is  Debby Sen MD.    Patient is doing really well. She is done breastfeeding and stopped at about 6 months or so. Was and is taking minipill. At first was forgetting it a lot and was late with it and her periods were every 2 weeks and heavy at times and prolonged spotting at other times. Now is better about taking it within a 2 hour window at least and not forgetting. Periods are now very regular, once a month and really light with almost no bleeding so loves that    Has had a h.o migraines with aura in the past. Definitely ramps up a bit with pregnancy but actually when not pregnant they're much better than they were years ago and actually almost never has one now. Has been on ocps in the past and it didn't increase her migraines at all    Has a presumed cyst deep in her right thigh taht we/she found a while ago. Hasn't changed but wondering if something should be done or what it is. Feels like she may now feel one on her left side/flank/back    Exercising a lot. Stopped her gym membership b/c can work out at her office so then frequently is staying in sweaty work out clothes for a while or if/when changes she doesn't get a chance to shower but will just use some freshening up wet wipes. When she does that, or when she's hot has horrible itching between her breasts and has for years. However now is noticing some of it on her right lateral breast. Was constant for a month and now the last week or so it's actually better.    Aicha did the myrisk panel and found out she's neg for all mutations. Their dad's female relatives all had breast or ovarian cancer. No one living anymore to have genetic testing for so not sure if there was a gene mutation but quite a few family members with it. Pretty sure she's done  having kids though haven't ruled it out 100%. Didn't want to test when still planning to have kids and wouldn't have done surgeries anyway. Now that likely done thinks it would be best to just do it and figure it out.    Has been exercising almost every day. Doing an online beach body bootcamp for 8 weeks and on week 4-5 or so. Doesn't weigh herself so thought she was under 190# and was shocked when she was over 200# still. Really tries to eat quite healthy, not a night time snacker. Minimal alcohol. Always exercised regularly and now even more so. Does feel like her clothes fit better and she's stronger and getting more muscle tone but especially her abdominal skin and mid section just don't change      GYNECOLOGIC HISTORY:    Patient's last menstrual period was 2020 (within days).    Regular menses? yes  Menses every 28-30 days.  Length of menses: 4 days    Her current contraception method is: oral contraceptives.  She  reports that she has never smoked. She has never used smokeless tobacco.    Patient is sexually active.  STD testing offered?  Declined  Last PHQ-9 score on record =   PHQ-9 SCORE 2020   PHQ-9 Total Score 1     Last GAD7 score on record =   MEDINA-7 SCORE 2020   Total Score 0     Alcohol Score = 0    HEALTH MAINTENANCE:  Cholesterol: Gets Biometrics from work  Last Mammo: Not applicable, Next Mammo: Due at age 38   Pap: HPV: negative  Lab Results   Component Value Date    PAP NIL 2018      Colonoscopy:  NA, Next Colonoscopy: age 48.  Dexa:  NA    Health maintenance updated:  yes    HISTORY:  OB History    Para Term  AB Living   3 2 2 0 1 2   SAB TAB Ectopic Multiple Live Births   1 0 0 0 2      # Outcome Date GA Lbr Ashish/2nd Weight Sex Delivery Anes PTL Lv   3 Term 18 39w2d  3.385 kg (7 lb 7.4 oz) F CS-LTranv Spinal  VONDA      Birth Comments: followed and delivered by [facility]. planned repeat c/s. no complications      Name: Lavern      Apgar1: 9  Apgar5: 9    2 Term 16 40w0d 07:20 / 05:29 3.57 kg (7 lb 13.9 oz) M CS-LTranv EPI N VONDA      Birth Comments: forebag SROM so then pitocin induction and then finally AROM of remainder of bag. 4 to complete quickly. labored down 2 hrs then pushed 2 hrs adn no descent past +1 and then fetal tachy. at time of c/s head was felt to be CPD and OP      Name: Dustin      Apgar1: 6  Apgar5: 8   1 SAB 09/01/15     SAB          Patient Active Problem List   Diagnosis     Cold sore     Past Surgical History:   Procedure Laterality Date      SECTION N/A 2016    Procedure:  SECTION;  Surgeon: Debby Sen MD;  Location:  [facility]      SECTION N/A 2018    Procedure: REPEAT  SECTION;  Surgeon: Debby Sen MD;  Location:  [facility]      Social History     Tobacco Use     Smoking status: Never Smoker     Smokeless tobacco: Never Used   Substance Use Topics     Alcohol use: No     Alcohol/week: 0.0 standard drinks      Problem (# of Occurrences) Relation (Name,Age of Onset)    Aneurysm (2) Maternal Grandmother (80), Paternal Grandfather    Breast Cancer (3) Paternal Grandmother, Maternal Aunt, Other: PGGM    Heart Disease (1) Maternal Grandfather    Ovarian Cancer (2) Paternal Grandmother, Maternal Aunt    Prostate Cancer (1) Paternal Uncle (50)            Current Outpatient Medications   Medication Sig     Acetaminophen (TYLENOL PO) Take 650 mg by mouth every 4 hours as needed for mild pain or fever (migraines)     norethindrone (MICRONOR) 0.35 MG tablet Take 1 tablet (0.35 mg) by mouth daily     valACYclovir (VALTREX) 1000 mg tablet TAKE 2 TABLETS BY MOUTH TWICE DAILY FOR 1 DAY     No current facility-administered medications for this visit.      Allergies   Allergen Reactions     Seasonal Allergies        Past medical, surgical, social and family histories were reviewed and updated in EPIC.    ROS:   12 point review of systems negative other than symptoms noted below or in the HPI.  No urinary  "frequency or dysuria, bladder or kidney problems, POSITIVE for:, light menses, difficulty with weight loss    EXAM:  /80 (BP Location: Right arm, Patient Position: Sitting, Cuff Size: Adult Regular)   Pulse 72   Ht 1.715 m (5' 7.5\")   Wt 92.1 kg (203 lb)   LMP 01/27/2020 (Within Days)   Breastfeeding No   BMI 31.33 kg/m     BMI: Body mass index is 31.33 kg/m .    PHYSICAL EXAM:  Constitutional:   Appearance: Well nourished, well developed, alert, in no acute distress  Neck:  Lymph Nodes:  No lymphadenopathy present    Thyroid:  Gland size normal, nontender, no nodules or masses present  on palpation  Chest:  Respiratory Effort:  Breathing unlabored  Cardiovascular:    Heart: Auscultation:  Regular rate, normal rhythm, no murmurs present  Breasts: Palpation of Breasts and Axillae:  No masses present on palpation, no breast tenderness. and No nodularity, asymmetry or nipple discharge bilaterally.  Gastrointestinal:   Abdominal Examination:  Abdomen nontender to palpation, tone normal without rigidity or guarding, no masses present, umbilicus without lesions   Liver and Spleen:  No hepatomegaly present, liver nontender to palpation    Hernias:  No hernias present  Lymphatic: Lymph Nodes:  No other lymphadenopathy present  Skin:  General Inspection:  CLEAR PITYRIASIS BETWEEN BREASTS AND THEN ONE VERY FAINT SALMON COLORED SMOOTH NONSCALY PATCH OF IT ON HER RIGHT LATERAL BREAST BUT NOT UPPER LATERAL WHERE MORE OF HER ITCHING WAS BY HER REPORT  VERY DEEP IN HER RIGHT MEDIAL THIGH THERE IS AN ALMOND SIZED CYST VS LN VS LIPOMA. THERE IS A GRANULAR NODULAR LIKE SPOT ON HER LEFT MEDIAL THIGH BUT NOT SMOOTH AND ROUND AND ENLARGED LIKE THE OTHER. DO NOT FEEL ANYTHING ON HER LEFT FLANK/BACK THOUGH  Neurologic:    Mental Status:  Oriented X3.  Normal strength and tone, sensory exam                grossly normal, mentation intact and speech normal.    Psychiatric:   Mentation appears normal and affect " normal/bright.         Pelvic Exam:  External Genitalia:     Normal appearance for age, no discharge present, no tenderness present, no inflammatory lesions present, color normal  Vagina:     Normal vaginal vault without central or paravaginal defects, no discharge present, no inflammatory lesions present, no masses present  Bladder:     Nontender to palpation  Urethra:   Urethral Body:  Urethra palpation normal, urethra structural support normal   Urethral Meatus:  No erythema or lesions present  Cervix:     Appearance healthy, no lesions present, nontender to palpation, no bleeding present  Uterus:     Uterus: firm, normal sized and nontender, anteverted in position.   Adnexa:     No adnexal tenderness present, no adnexal masses present  Perineum:     Perineum within normal limits, no evidence of trauma, no rashes or skin lesions present  Anus:     Anus within normal limits, no hemorrhoids present  Inguinal Lymph Nodes:     No lymphadenopathy present  Pubic Hair:     Normal pubic hair distribution for age  Genitalia and Groin:     No rashes present, no lesions present, no areas of discoloration, no masses present      COUNSELING:   Reviewed preventive health counseling, as reflected in patient instructions  Special attention given to:        Regular exercise       Healthy diet/nutrition       Contraception    BMI: Body mass index is 31.33 kg/m .  Weight management plan: Discussed healthy diet and exercise guidelines    ASSESSMENT:  34 year old female with satisfactory annual exam.    ICD-10-CM    1. Encounter for gynecological examination without abnormal finding Z01.419    2. Pityriasis L21.0    3. Mass of soft tissue of thigh R22.40    4. Oral contraceptive pill surveillance Z30.41 norethindrone (MICRONOR) 0.35 MG tablet   5. Migraine with aura and without status migrainosus, not intractable G43.109    6. Cold sore B00.1 valACYclovir (VALTREX) 1000 mg tablet   7. Family history of breast cancer Z80.3 full myriad  "dania panel: Laboratory Miscellaneous Order     Send outs misc test   8. Family history of ovarian cancer Z80.41 full [facility] myrisk panel: Laboratory Miscellaneous Order     Send outs misc test       PLAN:  Pap is UTD for 3 more years  Should start mammo next year given her family history and will then have been done breast feeding for >6 months  Discussed genetic testing. Pros and cons of full vs just BRCA. Discussed that high likelihood of a gene mutation given how many family members had cancer on her dad's side but w/o knowing if they had a gene or which one, her being negative is a huge reassurance but doesn't fully exclude some other familial/genetic clustering.  Discussed testing for genes with very strong recommendations for prevention and some genes that much less is known about and what that information can provide to her, pro and con.  At this time will do full myriad panel. If needs genetic counseling to be covered then [facility] will inform her of that. If >$375 then will be contacted to see if wants to proceed with the testing    Discussed her ocps. Now getting very light and regular period. If got pregnant with the 94% effectiveness it wouldn't be the worst thing, with her h.o migraines with aura likely best to avoid estrogen anyway though has done fine with it before. Discussed slynd and poor coverage as well as IUD but it \"freaks me out\" so will continue with this for now and try to be very regimented about timing of dose    Refilled valtrex for the year for prn cold sores    Discussed her cyst in her thigh. So deep that wouldn't be accessible with in office clinic procedure. Unclear if lipoma or fluid filled cyst. Could see derm and should do that anyway for a once a year check and some other new skin lesions she has so will set that up. Could have gen surg remove it as well but if not enlarging then could jsut monitor    Discussed her itching being due to pityriasis which is fungal. Start with BID " selsun blue shampoo. If not improved would do an otc terbanifine. If still not better would defer to derm.    Spent an additional 15 min, >50% of which was in face to face counseling time, discussed genetic testing, cyst, pityriasis.    Debby Sen MD

## 2020-02-12 ENCOUNTER — TRANSFERRED RECORDS (OUTPATIENT)
Dept: HEALTH INFORMATION MANAGEMENT | Facility: CLINIC | Age: 35
End: 2020-02-12

## 2020-02-12 ENCOUNTER — OFFICE VISIT (OUTPATIENT)
Dept: OBGYN | Facility: CLINIC | Age: 35
End: 2020-02-12
Payer: COMMERCIAL

## 2020-02-12 VITALS
DIASTOLIC BLOOD PRESSURE: 80 MMHG | HEIGHT: 68 IN | SYSTOLIC BLOOD PRESSURE: 110 MMHG | WEIGHT: 203 LBS | HEART RATE: 72 BPM | BODY MASS INDEX: 30.77 KG/M2

## 2020-02-12 DIAGNOSIS — Z80.41 FAMILY HISTORY OF OVARIAN CANCER: ICD-10-CM

## 2020-02-12 DIAGNOSIS — Z30.41 ORAL CONTRACEPTIVE PILL SURVEILLANCE: ICD-10-CM

## 2020-02-12 DIAGNOSIS — Z80.3 FAMILY HISTORY OF BREAST CANCER: ICD-10-CM

## 2020-02-12 DIAGNOSIS — M79.89 MASS OF SOFT TISSUE OF THIGH: ICD-10-CM

## 2020-02-12 DIAGNOSIS — Z01.419 ENCOUNTER FOR GYNECOLOGICAL EXAMINATION WITHOUT ABNORMAL FINDING: Primary | ICD-10-CM

## 2020-02-12 DIAGNOSIS — G43.109 MIGRAINE WITH AURA AND WITHOUT STATUS MIGRAINOSUS, NOT INTRACTABLE: ICD-10-CM

## 2020-02-12 DIAGNOSIS — B00.1 COLD SORE: ICD-10-CM

## 2020-02-12 DIAGNOSIS — L21.0 PITYRIASIS: ICD-10-CM

## 2020-02-12 LAB — MISCELLANEOUS TEST: NORMAL

## 2020-02-12 PROCEDURE — 99213 OFFICE O/P EST LOW 20 MIN: CPT | Mod: 25 | Performed by: OBSTETRICS & GYNECOLOGY

## 2020-02-12 PROCEDURE — 99395 PREV VISIT EST AGE 18-39: CPT | Performed by: OBSTETRICS & GYNECOLOGY

## 2020-02-12 RX ORDER — ACETAMINOPHEN AND CODEINE PHOSPHATE 120; 12 MG/5ML; MG/5ML
0.35 SOLUTION ORAL DAILY
Qty: 84 TABLET | Refills: 4 | Status: SHIPPED | OUTPATIENT
Start: 2020-02-12 | End: 2020-09-02

## 2020-02-12 RX ORDER — VALACYCLOVIR HYDROCHLORIDE 1 G/1
TABLET, FILM COATED ORAL
Qty: 12 TABLET | Refills: 3 | Status: SHIPPED | OUTPATIENT
Start: 2020-02-12 | End: 2020-12-11

## 2020-02-12 ASSESSMENT — PATIENT HEALTH QUESTIONNAIRE - PHQ9
5. POOR APPETITE OR OVEREATING: NOT AT ALL
SUM OF ALL RESPONSES TO PHQ QUESTIONS 1-9: 1

## 2020-02-12 ASSESSMENT — MIFFLIN-ST. JEOR: SCORE: 1651.36

## 2020-02-12 ASSESSMENT — ANXIETY QUESTIONNAIRES
2. NOT BEING ABLE TO STOP OR CONTROL WORRYING: NOT AT ALL
GAD7 TOTAL SCORE: 0
7. FEELING AFRAID AS IF SOMETHING AWFUL MIGHT HAPPEN: NOT AT ALL
1. FEELING NERVOUS, ANXIOUS, OR ON EDGE: NOT AT ALL
5. BEING SO RESTLESS THAT IT IS HARD TO SIT STILL: NOT AT ALL
IF YOU CHECKED OFF ANY PROBLEMS ON THIS QUESTIONNAIRE, HOW DIFFICULT HAVE THESE PROBLEMS MADE IT FOR YOU TO DO YOUR WORK, TAKE CARE OF THINGS AT HOME, OR GET ALONG WITH OTHER PEOPLE: NOT DIFFICULT AT ALL
6. BECOMING EASILY ANNOYED OR IRRITABLE: NOT AT ALL
3. WORRYING TOO MUCH ABOUT DIFFERENT THINGS: NOT AT ALL

## 2020-02-13 ENCOUNTER — TELEPHONE (OUTPATIENT)
Dept: OBGYN | Facility: CLINIC | Age: 35
End: 2020-02-13

## 2020-02-13 ASSESSMENT — ANXIETY QUESTIONNAIRES: GAD7 TOTAL SCORE: 0

## 2020-02-13 NOTE — TELEPHONE ENCOUNTER
Pt had Vinnie Funez done yesterday. Called and spoke with pt about process and testing. Pt can call me with any questions or concerns she may have going forward.    Will follow up in 1 month.

## 2020-02-24 ENCOUNTER — MEDICAL CORRESPONDENCE (OUTPATIENT)
Dept: HEALTH INFORMATION MANAGEMENT | Facility: CLINIC | Age: 35
End: 2020-02-24

## 2020-02-24 NOTE — TELEPHONE ENCOUNTER
Insurance requires genetic counseling, LM with plan/process of counseling through [facility] on pt PHI. Faxed form to [place]

## 2020-03-05 ENCOUNTER — TRANSFERRED RECORDS (OUTPATIENT)
Dept: HEALTH INFORMATION MANAGEMENT | Facility: CLINIC | Age: 35
End: 2020-03-05

## 2020-03-05 ENCOUNTER — MEDICAL CORRESPONDENCE (OUTPATIENT)
Dept: HEALTH INFORMATION MANAGEMENT | Facility: CLINIC | Age: 35
End: 2020-03-05

## 2020-03-19 ENCOUNTER — TRANSFERRED RECORDS (OUTPATIENT)
Dept: HEALTH INFORMATION MANAGEMENT | Facility: CLINIC | Age: 35
End: 2020-03-19

## 2020-03-20 ENCOUNTER — TELEPHONE (OUTPATIENT)
Dept: OBGYN | Facility: CLINIC | Age: 35
End: 2020-03-20

## 2020-03-20 NOTE — TELEPHONE ENCOUNTER
"LMTCB   Please let patient know her genetic testing \"Tiffanie\" came into clinic. We mailed out the results to the current address on file.   "

## 2020-03-26 LAB
LOCATION PERFORMED: NORMAL
RESULT: NORMAL
SEND OUTS MISC TEST CODE: NORMAL
SEND OUTS MISC TEST SPECIMEN: NORMAL
TEST NAME: NORMAL

## 2020-08-26 DIAGNOSIS — Z30.41 ORAL CONTRACEPTIVE PILL SURVEILLANCE: ICD-10-CM

## 2020-08-27 RX ORDER — NORETHINDRONE
KIT
Qty: 84 TABLET | Refills: 4 | OUTPATIENT
Start: 2020-08-27

## 2020-08-27 NOTE — TELEPHONE ENCOUNTER
"Requested Prescriptions   Pending Prescriptions Disp Refills     NORLYDA 0.35 MG tablet [Pharmacy Med Name: NORLYDA 0.35MG TABLETS 28S] 84 tablet 4     Sig: TAKE 1 TABLET(0.35 MG) BY MOUTH DAILY       Contraceptives Protocol Passed - 8/26/2020 11:48 AM        Passed - Patient is not a current smoker if age is 35 or older        Passed - Recent (12 mo) or future (30 days) visit within the authorizing provider's specialty     Patient has had an office visit with the authorizing provider or a provider within the authorizing providers department within the previous 12 mos or has a future within next 30 days. See \"Patient Info\" tab in inbasket, or \"Choose Columns\" in Meds & Orders section of the refill encounter.              Passed - Medication is active on med list        Passed - No active pregnancy on record        Passed - No positive pregnancy test in past 12 months           Last Written Prescription Date:  02/12/2020  Last Fill Quantity: 84,  # refills: 4   Last office visit: 2/12/2020 with prescribing provider:  Dr. Sen   Future Office Visit:      Refills available  Rosario Renee RN on 8/27/2020 at 3:04 PM        "

## 2020-09-02 RX ORDER — ACETAMINOPHEN AND CODEINE PHOSPHATE 120; 12 MG/5ML; MG/5ML
0.35 SOLUTION ORAL DAILY
Qty: 84 TABLET | Refills: 1 | Status: SHIPPED | OUTPATIENT
Start: 2020-09-02 | End: 2021-02-03

## 2020-09-02 NOTE — TELEPHONE ENCOUNTER
Pt requesting Rx be transferred to [facility] pharmacy from mail order. Having difficulty getting Rx transferred.  Rx sent to [facility] until next annual due 2/2021    Pt verbalized understanding, in agreement with plan, and voiced no further questions.  Rhea Sarmiento RN on 9/2/2020 at 2:48 PM

## 2020-12-10 DIAGNOSIS — B00.1 COLD SORE: ICD-10-CM

## 2020-12-10 NOTE — TELEPHONE ENCOUNTER
"Requested Prescriptions   Pending Prescriptions Disp Refills     valACYclovir (VALTREX) 1000 mg tablet 12 tablet 3     Sig: TAKE 2 TABLETS BY MOUTH TWICE DAILY FOR 1 DAY       Antivirals for Herpes Protocol Failed - 12/10/2020  3:46 PM        Failed - Normal serum creatinine on file in past 12 months     No lab results found.    Ok to refill medication if creatinine is low          Passed - Patient is age 12 or older        Passed - Recent (12 mo) or future (30 days) visit within the authorizing provider's specialty     Patient has had an office visit with the authorizing provider or a provider within the authorizing providers department within the previous 12 mos or has a future within next 30 days. See \"Patient Info\" tab in inbasket, or \"Choose Columns\" in Meds & Orders section of the refill encounter.              Passed - Medication is active on med list           Last Written Prescription Date:  02/12/20  Last Fill Quantity: 12,  # refills: 3   Last office visit: 2/12/2020 with prescribing provider:  [facility]   Future Office Visit: None found  Prescription approved per [facility] Refill Protocol.  Rosario Renee RN on 12/11/2020 at 8:46 AM          "

## 2020-12-11 RX ORDER — VALACYCLOVIR HYDROCHLORIDE 1 G/1
TABLET, FILM COATED ORAL
Qty: 12 TABLET | Refills: 3 | Status: SHIPPED | OUTPATIENT
Start: 2020-12-11 | End: 2021-05-21

## 2021-02-03 DIAGNOSIS — Z30.41 ORAL CONTRACEPTIVE PILL SURVEILLANCE: ICD-10-CM

## 2021-02-03 RX ORDER — NORETHINDRONE
KIT
Qty: 84 TABLET | Refills: 0 | Status: SHIPPED | OUTPATIENT
Start: 2021-02-03 | End: 2021-05-04

## 2021-02-03 NOTE — TELEPHONE ENCOUNTER
"Requested Prescriptions   Pending Prescriptions Disp Refills     NORLYDA 0.35 MG tablet [Pharmacy Med Name: NORLYDA 0.35MG TABLETS 28S] 84 tablet 1     Sig: TAKE 1 TABLET(0.35 MG) BY MOUTH DAILY       Contraceptives Protocol Passed - 2/3/2021  3:23 AM        Passed - Patient is not a current smoker if age is 35 or older        Passed - Recent (12 mo) or future (30 days) visit within the authorizing provider's specialty     Patient has had an office visit with the authorizing provider or a provider within the authorizing providers department within the previous 12 mos or has a future within next 30 days. See \"Patient Info\" tab in inbasket, or \"Choose Columns\" in Meds & Orders section of the refill encounter.              Passed - Medication is active on med list        Passed - No active pregnancy on record        Passed - No positive pregnancy test in past 12 months           Last Written Prescription Date:  9/2/20  Last Fill Quantity: 84,  # refills: 1   Last office visit: 2/12/2020 with prescribing provider:  Dr harmon   Future Office Visit:  None    Prescription approved per [facility] [place].    Lizeth Adkins RN        "

## 2021-03-13 ENCOUNTER — HEALTH MAINTENANCE LETTER (OUTPATIENT)
Age: 36
End: 2021-03-13

## 2021-05-04 DIAGNOSIS — Z30.41 ORAL CONTRACEPTIVE PILL SURVEILLANCE: ICD-10-CM

## 2021-05-04 RX ORDER — NORETHINDRONE
KIT
Qty: 84 TABLET | Refills: 0 | Status: SHIPPED | OUTPATIENT
Start: 2021-05-04 | End: 2021-05-21

## 2021-05-04 NOTE — TELEPHONE ENCOUNTER
"Requested Prescriptions   Pending Prescriptions Disp Refills     NORLYDA 0.35 MG tablet [Pharmacy Med Name: NORLYDA 0.35MG TABLETS 28S] 84 tablet 0     Sig: TAKE 1 TABLET(0.35 MG) BY MOUTH DAILY       Contraceptives Protocol Passed - 5/4/2021  3:26 AM        Passed - Patient is not a current smoker if age is 35 or older        Passed - Recent (12 mo) or future (30 days) visit within the authorizing provider's specialty     Patient has had an office visit with the authorizing provider or a provider within the authorizing providers department within the previous 12 mos or has a future within next 30 days. See \"Patient Info\" tab in inbasket, or \"Choose Columns\" in Meds & Orders section of the refill encounter.              Passed - Medication is active on med list        Passed - No active pregnancy on record        Passed - No positive pregnancy test in past 12 months           Last Written Prescription Date:  2/3/21  Last Fill Quantity: 84,  # refills: 0   Last office visit: 2/12/2020 with prescribing provider:  [facility]   Future Office Visit:   Next 5 appointments (look out 90 days)    May 14, 2021  8:30 AM  PHYSICAL with Debby Sen MD  [facility] ([facility] ) [address] [place] [address] [id number] [phone]      Prescription approved per [facility] Refill Protocol.  Rosario Renee RN on 5/4/2021 at 8:07 AM            "

## 2021-05-20 NOTE — PROGRESS NOTES
"Sherie is a 36 year old  female who presents for annual exam.     Besides routine health maintenance, she has no other health concerns today.    HPI:  The patient's PCP is Dr. Debby Sen MD.      Patient has had a really difficult year but other than those situational thing she is actually doing well.  Was working out a lot back when I saw her  and had hardly lost any weight. After leaving here really ramped it up and really watching her diet and limiting portions and mostly cutting back on carbs, etc.  Did get to a low of 180's# but is up a bit now. However is still down 12# compared to weight last year. Running with her sister and doing more strength training and feeling strong and fit and just mentally has made such a huge difference for her mental health    Is doing just a minipill even though hasn't  in over a year. Isn't great about taking it every day at the same time but no more than a 4 hour window and usually less. Does it at bedtime. Period comes every month but lighter and only 4 days. No side effects. Knows it's less effective than a combo pill. Decided to stick with it b/c she has a h.o migraines in the past that did worsen with pregnancy. However prior to kids was on KAYLIE for a long time and never had an issue with it. Really \"creeps me out\" to do a nuvaring or an IUD but is open to whatever might be best b/c definitely not planning any more kids    No fasting labs in quite a while. Not fasting today but would like to come back for true fasting labs in near future    Got J&J vaxx and had the normal typical s.e from it but nothing more.    Ta's mom dx'd with cancer last year and now progressive and transitioning to hospice care. Ta's cousin that he was really close to lived in [place] and was murdered by her BF and he's still on the run and they haven't caught him. 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 Also a 40 yo coworker  of covid within 3 days of not feeling well. So has really been quite tough. Has gotten more into prayer and meditation and affirmations and is grateful for that. She also got laid off this last year but now has a contract position and it's actually for the best b/c less stressful and less travel now that has little kids    Wondering about her mammo start. Entire paternal female side with breast and ovarian. mariama is myriad full panel neg but none of those relatives were ever gene tested to know if they were positive      GYNECOLOGIC HISTORY:    Patient's last menstrual period was 2021.    Regular menses? yes  Menses every 28 days.  Length of menses: 4 days    Her current contraception method is: oral contraceptives.  She  reports that she has never smoked. She has never used smokeless tobacco.    Patient is sexually active.  STD testing offered?  Declined     Last PHQ-9 score on record =   PHQ-9 SCORE 2021   PHQ-9 Total Score 1     Last GAD7 score on record =   MEDINA-7 SCORE 2021   Total Score 1     Alcohol Score = 0    HEALTH MAINTENANCE:  Cholesterol: No results   Last Mammo: Not applicable, Next Mammo: Due at age 38   Pap:   Lab Results   Component Value Date    PAP NIL NEG-HPV 2018      Colonoscopy:  N/A, Next Colonoscopy: Due at age 43   Dexa:  N/A    Health maintenance updated:  yes    HISTORY:  OB History    Para Term  AB Living   3 2 2 0 1 2   SAB TAB Ectopic Multiple Live Births   1 0 0 0 2      # Outcome Date GA Lbr Ashish/2nd Weight Sex Delivery Anes PTL Lv   3 Term 18 39w2d  3.385 kg (7 lb 7.4 oz) F CS-LTranv Spinal  VONDA      Birth Comments: followed and delivered by [facility]. planned repeat c/s. no complications      Name: Lavern      Apgar1: 9  Apgar5: 9   2 Term 16 40w0d 07:20 / 05:29 3.57 kg (7 lb 13.9 oz) M CS-LTranv EPI N VONDA      Birth Comments: forebag SROM so then pitocin induction and  then finally AROM of remainder of bag. 4 to complete quickly. labored down 2 hrs then pushed 2 hrs adn no descent past +1 and then fetal tachy. at time of c/s head was felt to be CPD and OP      Name: Dustin      Apgar1: 6  Apgar5: 8   1 SAB 09/01/15     SAB          Patient Active Problem List   Diagnosis     Cold sore     Past Surgical History:   Procedure Laterality Date      SECTION N/A 2016    Procedure:  SECTION;  Surgeon: Debby Sen MD;  Location:  [facility]      SECTION N/A 2018    Procedure: REPEAT  SECTION;  Surgeon: Debby Sen MD;  Location:  [facility]      Social History     Tobacco Use     Smoking status: Never Smoker     Smokeless tobacco: Never Used   Substance Use Topics     Alcohol use: No     Alcohol/week: 0.0 standard drinks      Problem (# of Occurrences) Relation (Name,Age of Onset)    Aneurysm (2) Maternal Grandmother (80), Paternal Grandfather    Breast Cancer (3) Paternal Grandmother, Maternal Aunt, Other: [facility]    Heart Disease (1) Maternal Grandfather    Ovarian Cancer (2) Paternal Grandmother, Maternal Aunt    Prostate Cancer (1) Paternal Uncle (50)            Current Outpatient Medications   Medication Sig     Acetaminophen (TYLENOL PO) Take 650 mg by mouth every 4 hours as needed for mild pain or fever (migraines)     albuterol (PROAIR HFA/PROVENTIL HFA/VENTOLIN HFA) 108 (90 Base) MCG/ACT inhaler INHALE 2 PUFFS BY MOUTH EVERY 4 HOURS AS NEEDED     norethindrone (NORLYDA) 0.35 MG tablet TAKE 1 TABLET(0.35 MG) BY MOUTH DAILY     valACYclovir (VALTREX) 1000 mg tablet TAKE 2 TABLETS BY MOUTH TWICE DAILY FOR 1 DAY     No current facility-administered medications for this visit.      Allergies   Allergen Reactions     Seasonal Allergies        Past medical, surgical, social and family histories were reviewed and updated in EPIC.    ROS:   12 point review of systems negative other than symptoms noted below or in the HPI.  No urinary frequency or  "dysuria, bladder or kidney problems, Normal menstrual cycles    EXAM:  /60   Ht 1.702 m (5' 7\")   Wt 86.6 kg (191 lb)   LMP 05/07/2021   BMI 29.91 kg/m     BMI: Body mass index is 29.91 kg/m .    PHYSICAL EXAM:  Constitutional:   Appearance: Well nourished, well developed, alert, in no acute distress  Neck:  Lymph Nodes:  No lymphadenopathy present    Thyroid:  Gland size normal, nontender, no nodules or masses present  on palpation  Chest:  Respiratory Effort:  Breathing unlabored, cta BILATERALLY  Cardiovascular:    Heart: Auscultation:  Regular rate, normal rhythm, no murmurs present  Breasts: Palpation of Breasts and Axillae:  No masses present on palpation, no breast tenderness., Axillary Lymph Nodes:  No lymphadenopathy present., No nodularity, asymmetry or nipple discharge bilaterally. and  STABLE F.C CHANGES  Gastrointestinal:   Abdominal Examination:  Abdomen nontender to palpation, tone normal without rigidity or guarding, no masses present, umbilicus without lesions   Liver and Spleen:  No hepatomegaly present, liver nontender to palpation    Hernias:  No hernias present  Lymphatic: Lymph Nodes:  No other lymphadenopathy present  Skin:  General Inspection:  No rashes present, no lesions present, no areas of  discoloration  Neurologic:    Mental Status:  Oriented X3.  Normal strength and tone, sensory exam                grossly normal, mentation intact and speech normal.    Psychiatric:   Mentation appears normal and affect normal/bright.         Pelvic Exam:  External Genitalia:     Normal appearance for age, no discharge present, no tenderness present, no inflammatory lesions present, color normal  Vagina:     Normal vaginal vault without central or paravaginal defects, no discharge present, no inflammatory lesions present, no masses present  Bladder:     Nontender to palpation  Urethra:   Urethral Body:  Urethra palpation normal, urethra structural support normal   Urethral Meatus:  No " erythema or lesions present  Cervix:     Appearance healthy, no lesions present, nontender to palpation, no bleeding present  Uterus:     Uterus: firm, normal sized and nontender, anteverted in position.   Adnexa:     No adnexal tenderness present, no adnexal masses present  Perineum:     Perineum within normal limits, no evidence of trauma, no rashes or skin lesions present  Anus:     Anus within normal limits, no hemorrhoids present  Inguinal Lymph Nodes:     No lymphadenopathy present  Pubic Hair:     Normal pubic hair distribution for age  Genitalia and Groin:     No rashes present, no lesions present, no areas of discoloration, no masses present      COUNSELING:   Reviewed preventive health counseling, as reflected in patient instructions  Special attention given to:        Regular exercise       Healthy diet/nutrition       Contraception    BMI: Body mass index is 29.91 kg/m .  Weight management plan: Discussed healthy diet and exercise guidelines    ASSESSMENT:  36 year old female with satisfactory annual exam.    ICD-10-CM    1. Encounter for gynecological examination without abnormal finding  Z01.419    2. Oral contraceptive pill surveillance  Z30.41 norethindrone (NORLYDA) 0.35 MG tablet   3. Cold sore  B00.1 valACYclovir (VALTREX) 1000 mg tablet   4. Screening for cardiovascular condition  Z13.6 CANCELED: Lipid panel reflex to direct LDL Fasting   5. Screening for metabolic disorder  Z13.228 Comprehensive metabolic panel     Lipid panel reflex to direct LDL Fasting   6. Screening for thyroid disorder  Z13.29 TSH with free T4 reflex   7. Encounter for vitamin deficiency screening  Z13.21 Vitamin D Deficiency   8. Screening for disorder of blood and blood-forming organs  Z13.0 CBC with platelets   9. Screening for diabetes mellitus  Z13.1 Hemoglobin A1c       PLAN:  Pap is UTD for 2 more years  Will plan to start mammo next year at age 37. Only one year ahead of normal schedule and her risk is lower with  neg gene testing, however no clear genetic familial mutation proven in her paternal family members so will start next year and sync up to annual    Refill valtrex for prn use with cold sores  Refill her minipill for now and will try to just really focus on taking it at the same time.  Reviewed going back on the COCs she was on. No record in epic so may have to call pharmacy where has been filled to ask what it was if wants that  Discussed N.R and mirena IUD as well. Both feel strange to her but leaning towards mirena  Given info on it and will make appointment to have it placed and do morning fasting labs at the same time.    Discussed kyleena and smaller size for ease of placement with just c/s. However less amenorrhea and more DUB risk with that so thinks she'd prefer to just do a mirena at that point.    Debby Sen MD

## 2021-05-21 ENCOUNTER — OFFICE VISIT (OUTPATIENT)
Dept: OBGYN | Facility: CLINIC | Age: 36
End: 2021-05-21
Payer: COMMERCIAL

## 2021-05-21 VITALS
HEIGHT: 67 IN | WEIGHT: 191 LBS | SYSTOLIC BLOOD PRESSURE: 112 MMHG | BODY MASS INDEX: 29.98 KG/M2 | DIASTOLIC BLOOD PRESSURE: 60 MMHG

## 2021-05-21 DIAGNOSIS — Z13.21 ENCOUNTER FOR VITAMIN DEFICIENCY SCREENING: ICD-10-CM

## 2021-05-21 DIAGNOSIS — B00.1 COLD SORE: ICD-10-CM

## 2021-05-21 DIAGNOSIS — Z01.419 ENCOUNTER FOR GYNECOLOGICAL EXAMINATION WITHOUT ABNORMAL FINDING: Primary | ICD-10-CM

## 2021-05-21 DIAGNOSIS — Z13.0 SCREENING FOR DISORDER OF BLOOD AND BLOOD-FORMING ORGANS: ICD-10-CM

## 2021-05-21 DIAGNOSIS — Z13.29 SCREENING FOR THYROID DISORDER: ICD-10-CM

## 2021-05-21 DIAGNOSIS — Z13.1 SCREENING FOR DIABETES MELLITUS: ICD-10-CM

## 2021-05-21 DIAGNOSIS — Z13.6 SCREENING FOR CARDIOVASCULAR CONDITION: ICD-10-CM

## 2021-05-21 DIAGNOSIS — Z30.41 ORAL CONTRACEPTIVE PILL SURVEILLANCE: ICD-10-CM

## 2021-05-21 DIAGNOSIS — Z13.228 SCREENING FOR METABOLIC DISORDER: ICD-10-CM

## 2021-05-21 PROCEDURE — 99395 PREV VISIT EST AGE 18-39: CPT | Performed by: OBSTETRICS & GYNECOLOGY

## 2021-05-21 RX ORDER — VALACYCLOVIR HYDROCHLORIDE 1 G/1
TABLET, FILM COATED ORAL
Qty: 12 TABLET | Refills: 3 | Status: SHIPPED | OUTPATIENT
Start: 2021-05-21 | End: 2023-03-01

## 2021-05-21 RX ORDER — ALBUTEROL SULFATE 90 UG/1
AEROSOL, METERED RESPIRATORY (INHALATION)
COMMUNITY
Start: 2021-05-07 | End: 2023-10-17

## 2021-05-21 RX ORDER — ACETAMINOPHEN AND CODEINE PHOSPHATE 120; 12 MG/5ML; MG/5ML
SOLUTION ORAL
Qty: 84 TABLET | Refills: 4 | Status: SHIPPED | OUTPATIENT
Start: 2021-05-21 | End: 2022-08-03

## 2021-05-21 SDOH — ECONOMIC STABILITY: INCOME INSECURITY: HOW HARD IS IT FOR YOU TO PAY FOR THE VERY BASICS LIKE FOOD, HOUSING, MEDICAL CARE, AND HEATING?: NOT ASKED

## 2021-05-21 SDOH — ECONOMIC STABILITY: TRANSPORTATION INSECURITY
IN THE PAST 12 MONTHS, HAS THE LACK OF TRANSPORTATION KEPT YOU FROM MEDICAL APPOINTMENTS OR FROM GETTING MEDICATIONS?: NOT ASKED

## 2021-05-21 SDOH — ECONOMIC STABILITY: FOOD INSECURITY: WITHIN THE PAST 12 MONTHS, YOU WORRIED THAT YOUR FOOD WOULD RUN OUT BEFORE YOU GOT MONEY TO BUY MORE.: NOT ASKED

## 2021-05-21 SDOH — ECONOMIC STABILITY: FOOD INSECURITY: WITHIN THE PAST 12 MONTHS, THE FOOD YOU BOUGHT JUST DIDN'T LAST AND YOU DIDN'T HAVE MONEY TO GET MORE.: NOT ASKED

## 2021-05-21 SDOH — ECONOMIC STABILITY: TRANSPORTATION INSECURITY
IN THE PAST 12 MONTHS, HAS LACK OF TRANSPORTATION KEPT YOU FROM MEETINGS, WORK, OR FROM GETTING THINGS NEEDED FOR DAILY LIVING?: NOT ASKED

## 2021-05-21 ASSESSMENT — MIFFLIN-ST. JEOR: SCORE: 1579

## 2021-05-21 ASSESSMENT — PATIENT HEALTH QUESTIONNAIRE - PHQ9
5. POOR APPETITE OR OVEREATING: NOT AT ALL
SUM OF ALL RESPONSES TO PHQ QUESTIONS 1-9: 1

## 2021-05-21 ASSESSMENT — ANXIETY QUESTIONNAIRES
7. FEELING AFRAID AS IF SOMETHING AWFUL MIGHT HAPPEN: NOT AT ALL
GAD7 TOTAL SCORE: 1
5. BEING SO RESTLESS THAT IT IS HARD TO SIT STILL: NOT AT ALL
2. NOT BEING ABLE TO STOP OR CONTROL WORRYING: NOT AT ALL
IF YOU CHECKED OFF ANY PROBLEMS ON THIS QUESTIONNAIRE, HOW DIFFICULT HAVE THESE PROBLEMS MADE IT FOR YOU TO DO YOUR WORK, TAKE CARE OF THINGS AT HOME, OR GET ALONG WITH OTHER PEOPLE: NOT DIFFICULT AT ALL
3. WORRYING TOO MUCH ABOUT DIFFERENT THINGS: NOT AT ALL
1. FEELING NERVOUS, ANXIOUS, OR ON EDGE: SEVERAL DAYS
6. BECOMING EASILY ANNOYED OR IRRITABLE: NOT AT ALL

## 2021-05-22 ASSESSMENT — ANXIETY QUESTIONNAIRES: GAD7 TOTAL SCORE: 1

## 2021-10-23 ENCOUNTER — HEALTH MAINTENANCE LETTER (OUTPATIENT)
Age: 36
End: 2021-10-23

## 2021-11-30 ENCOUNTER — ALLIED HEALTH/NURSE VISIT (OUTPATIENT)
Dept: NURSING | Facility: CLINIC | Age: 36
End: 2021-11-30
Payer: COMMERCIAL

## 2021-11-30 DIAGNOSIS — Z23 NEED FOR PROPHYLACTIC VACCINATION AND INOCULATION AGAINST INFLUENZA: Primary | ICD-10-CM

## 2021-11-30 DIAGNOSIS — Z13.228 SCREENING FOR METABOLIC DISORDER: ICD-10-CM

## 2021-11-30 PROCEDURE — 90471 IMMUNIZATION ADMIN: CPT

## 2021-11-30 PROCEDURE — 90686 IIV4 VACC NO PRSV 0.5 ML IM: CPT

## 2021-11-30 PROCEDURE — 36415 COLL VENOUS BLD VENIPUNCTURE: CPT

## 2021-11-30 PROCEDURE — 99207 PR NO CHARGE NURSE ONLY: CPT

## 2021-11-30 PROCEDURE — 80061 LIPID PANEL: CPT

## 2021-12-01 LAB
CHOLEST SERPL-MCNC: 169 MG/DL
FASTING STATUS PATIENT QL REPORTED: YES
HDLC SERPL-MCNC: 69 MG/DL
LDLC SERPL CALC-MCNC: 83 MG/DL
NONHDLC SERPL-MCNC: 100 MG/DL
TRIGL SERPL-MCNC: 85 MG/DL

## 2022-03-18 ENCOUNTER — TELEPHONE (OUTPATIENT)
Dept: TRANSPLANT | Facility: CLINIC | Age: 37
End: 2022-03-18

## 2022-03-18 NOTE — TELEPHONE ENCOUNTER
"DL Number: [id number] DL State: [place]  Mother's Haines Name: Noris  Voucher: Wants More Info Registered As: Standard Voucher Donor  Donor Intake Start: 1/15/22 Donor Intake Complete: 22  Gender: Female Preferred Language: English  Full Name: Sherie Young Is  Needed: [not answered]  E-mail: [email or web address] Phone Number: [phone]  Secondary Phone:  Contact Preference: [not answered] Best Contact Time: 4pm - 5pm  Emergency Contact: Giovany Young Emergency Contact #: [phone]  Relationship to Contact: Contact is my spouse  : 81 Age: 37  Address: [address] [place] [address] City: [place]  State: [place] Postal Code: [id number]  Height: 5'7\" Weight: 190lbs  BMI: 29.8  Employment Status: Employed Has PTO for donation? Yes, using vacation  Occupation: Dir. Of Research Requires Heavy Lifting? No  Education Level: Four Year Degree Marital Status:   Exercise Routine: 2-3/Week Health Insurance: Yes  Blood Type: Unknown Ethnicity/Race: White  Donor Type: Standard Voucher Donor  Prefer Remote Donation: [not answered]  Physician: Dr. Debby Sen<br/>CÉSAR Tejada  Donating for Local Recipient  Recipient's Name: Dain Lawrence Recipient's : 15  Recipient's Status: Patient not on dialysis but  needs a transplant soon.  How Candidate Knows Recip: Acquaintance  Candidate communicates w/  Recip: Less Than Once A Month  Possible Interest In:  Motivation to donate: A friends child needs a transplant.  Living Donor Pre-Screening  Is In [place]  Will accept blood transfusions? Yes  Has been diagnosed with kidney disease? No  Has had a heart attack? No  Has Diabetes (High BGs)? No  Has had cancer? No  Has had kidney stones? No  Has ever been pregnant? Yes   - Is Currently Pregnant? No   - Months Since Pregnant? 24+   - Is Currently Nursing? No   - Had gestational diabetes? No   - Hypertension during pregnancy? Never  Is Planning on Pregnancy? No  Is Taking Birth Control? Yes   - " Birth Control Months? 36   - BirthControlForm? Oral   - Birth Control Complications? 36   - Is Able To Stop Birth Control? Yes  Has Used Tobacco? No  Has HIV? No  Is Currently Incarcerated? No  Is Currently Residing in [place]  History Misc  Has Allergies? Yes  Allergy  Cat  Rag weed  Has had Surgeries? Yes  Surgery When  Cesarian 9/23/16 and 12/07/18  Takes Medication? Yes  Medication Dose Frequency  Birth control Unsure Daily  Medical History  History of High BP? Never  History of CABG (bypass surgery)? No  History of blood clots? Never  History of coronary disease? Never  History of high cholesterol or triglycerides? Never  Has stents implanted? No  History of chest pain during exercise? No  History of chest pain at other times? No  Results of climbing 2 flights of stairs? No Problem  Had stress test in last year? No  Has had stroke? No  Has had leg bypass? No  History of lung disease? Never  History of COPD? Never  History of TB? Never  History of Pneumonia? Never  Has respiratory issues? Yes   - respiratory issues? Occasional excercise induced  asthma  Has HIV? No  Has Gastro Issues? No  History of Gallstones? Never  History of Pancreatitis? Never  History of Liver Disease? Never  History of Hepatitis B? Never  History of Hepatitis C? Never  History of bleeding problems? Never  History of UTIs? No  History of kidney damage? Never  History of Proteinuria? Never  History of Hematuria? Never  History of neuro disease? Never  History of seizure? Never  History of lupus? Never  History of paralysis? Never  History of arthritis? Never  History of neuropathy? Never  History of depression? Never  History of anxiety? Never  History of documented psychiatric illness? Never  History of Fibroid Uterus? Never  History of Endometriosis? Never  History of Polycystic Ovaries? Never  Has had Miscarriages? Yes   - how many: 1   - had late term miscarriage? No  Has had abortions? No  Has had transfusions? No  History of  Obesity? No  History of Fabry's Disease? No  History of Sickle Cell Disease? No  History of Sickle Cell Trait? No  History of Sarcoidosis? No  Has auto-immune disease? No  Has had Physical Exam? Yes   - how many years ago: 1  Has had Mammogram? No  Has had Pap Smear? Yes   - how many years ago: 2  Colonoscopy? No  Medical history comments? [no comments]  Living Donor Family Medical History  Anyone with kidney disease? No  Anyone with liver disease? No  Anyone with heart disease? No  Anyone with coronary artery disease? No  Anyone with high blood pressure? No  Anyone with blood disorder? No  Anyone with cancer? Yes   - which family members: Paternal grandmother (breast),  mom (skin)  Anyone with kidney cancer? No  Anyone with diabetes? No  Is mother alive? Yes  Mother's age? 72  Is father alive? Yes  Father's age? 71  How many siblings? 2  How many adult children? 0  How many children under 18? 2  Social History  Has Used Alcohol? No  Has Used Drugs? No  Has had legal issues w/ law enforcement? No  Traveled over 100 miles from home in last year? Yes   - Traveled Where? [facility]  Has had suicidal thoughts or attempts in the last five years? No

## 2022-03-18 NOTE — TELEPHONE ENCOUNTER
Sherie replied to welcome email and requested to be back-up at this time. Updated her record and shared contact information if questions or interested in NDD

## 2022-07-30 ENCOUNTER — HEALTH MAINTENANCE LETTER (OUTPATIENT)
Age: 37
End: 2022-07-30

## 2022-08-02 DIAGNOSIS — Z30.41 ORAL CONTRACEPTIVE PILL SURVEILLANCE: ICD-10-CM

## 2022-08-03 DIAGNOSIS — Z30.41 ORAL CONTRACEPTIVE PILL SURVEILLANCE: ICD-10-CM

## 2022-08-03 RX ORDER — ACETAMINOPHEN AND CODEINE PHOSPHATE 120; 12 MG/5ML; MG/5ML
SOLUTION ORAL
Qty: 84 TABLET | Refills: 0 | Status: SHIPPED | OUTPATIENT
Start: 2022-08-03 | End: 2022-09-01

## 2022-08-03 RX ORDER — NORETHINDRONE
KIT
Qty: 84 TABLET | OUTPATIENT
Start: 2022-08-03

## 2022-08-03 RX ORDER — NORETHINDRONE
KIT
Qty: 28 TABLET | Refills: 0 | Status: SHIPPED | OUTPATIENT
Start: 2022-08-03 | End: 2022-08-03

## 2022-08-03 NOTE — TELEPHONE ENCOUNTER
Pt due for annual, no appt scheduled. Pt already received one month extension. Rx denied.   Rhea Sarmiento RN on 8/3/2022 at 8:16 AM

## 2022-08-03 NOTE — TELEPHONE ENCOUNTER
Prescription approved per [facility] Refill Protocol.  Rhea Sarmiento RN on 8/3/2022 at 8:48 AM

## 2022-08-03 NOTE — TELEPHONE ENCOUNTER
"Requested Prescriptions   Pending Prescriptions Disp Refills     NORLYDA 0.35 MG tablet [Pharmacy Med Name: NORLYDA 0.35MG TABLETS 28S] 84 tablet 4     Sig: TAKE 1 TABLET(0.35 MG) BY MOUTH DAILY       Contraceptives Protocol Failed - 8/2/2022  8:15 PM        Failed - Recent (12 mo) or future (30 days) visit within the authorizing provider's specialty     Patient has had an office visit with the authorizing provider or a provider within the authorizing providers department within the previous 12 mos or has a future within next 30 days. See \"Patient Info\" tab in inbasket, or \"Choose Columns\" in Meds & Orders section of the refill encounter.              Passed - Patient is not a current smoker if age is 35 or older        Passed - Medication is active on med list        Passed - No active pregnancy on record        Passed - No positive pregnancy test in past 12 months           Last Written Prescription Date:  5/21/21  Last Fill Quantity: 84,  # refills: 4   Last office visit: 5/21/2021 with prescribing provider:  [facility]   Future Office Visit:  NONE    Medication is being filled for 1 time refill only due to:  Patient needs to be seen because it has been more than one year since last visit.  Adrienne Simmons RN on 8/3/2022 at 5:26 AM        "

## 2022-09-01 DIAGNOSIS — Z30.41 ORAL CONTRACEPTIVE PILL SURVEILLANCE: ICD-10-CM

## 2022-09-01 RX ORDER — NORETHINDRONE
KIT
Qty: 84 TABLET | Refills: 0 | Status: SHIPPED | OUTPATIENT
Start: 2022-09-01 | End: 2022-11-20

## 2022-09-01 NOTE — TELEPHONE ENCOUNTER
"Requested Prescriptions   Pending Prescriptions Disp Refills     NORLYDA 0.35 MG tablet [Pharmacy Med Name: NORLYDA 0.35MG TABLETS 28S] 28 tablet      Sig: TAKE 1 TABLET(0.35 MG) BY MOUTH DAILY       Contraceptives Protocol Failed - 9/1/2022  7:45 AM        Failed - Recent (12 mo) or future (30 days) visit within the authorizing provider's specialty     Patient has had an office visit with the authorizing provider or a provider within the authorizing providers department within the previous 12 mos or has a future within next 30 days. See \"Patient Info\" tab in inbasket, or \"Choose Columns\" in Meds & Orders section of the refill encounter.              Passed - Patient is not a current smoker if age is 35 or older        Passed - Medication is active on med list        Passed - No active pregnancy on record        Passed - No positive pregnancy test in past 12 months           Last Written Prescription Date:  8/3/22  Last Fill Quantity: 84,  # refills: 0   Last office visit: 5/21/2021 with prescribing provider:  Dr Sen   Future Office Visit:   Next 5 appointments (look out 90 days)    Oct 28, 2022 11:00 AM  PHYSICAL with Debby Sen MD  [facility] ([facility] ) [address] [place] [address] [id number] [phone]         Prescription approved per [facility] Refill Protocol.  Rhea Sarmiento RN on 9/1/2022 at 10:21 AM    "

## 2022-10-10 ENCOUNTER — HEALTH MAINTENANCE LETTER (OUTPATIENT)
Age: 37
End: 2022-10-10

## 2022-10-27 NOTE — PROGRESS NOTES
"Sherie is a 37 year old  female who presents for annual exam.     Besides routine health maintenance, she has no other health concerns today .    HPI:  The patient's PCP is  Debby Sen MD.     Patient is being seen today for her yearly physical.     10#'s down since 2020, and has been stable since then. Has been incorporating muscle training w/ her workout and dietary changes as well as still running with her sister a few times a week. Her clothes fit better and happy about that though does wish she could lose a bit more given that she is making this a regular mainstay of her life.     Has been on her minipill since was breast feeding 4 years ago and just never switched.  wants to discuss other options. Not ready for an IUD yet, concerned about having something  \"inside the body\". Previously discussed birth control options to suppress ovarian cancer as has a strong fhx of breast and ovarian in her paternal female relatives.  Remembers being on COCs before pregnancy at the lowest dose and did well on them.     Gets regular periods. Usually light flow on her minipill and has no side effects.     Had a bump on her right thigh, when pregnant with Lavern. Decreased after having her. Went to derm and said it was not concerning. After lost weight, noticed the bump more often. Wants to check that today.      Doing acupuncture for her anxiety and general just health and feels it has really helped. However they told that her heart rate was \"fast\". Doesn't feel palpitations or like she's sx from it. Able to do routine daily life things and exercise w/o any issues. Wondering if this is concerning.    GYNECOLOGIC HISTORY:    Patient's last menstrual period was 10/19/2022.    Regular menses? yes  Menses every 28-30 days.  Length of menses: 4 days    Her current contraception method is: oral contraceptives.  She  reports that she has never smoked. She has never used smokeless tobacco.    Patient is sexually " active.  STD testing offered?  Declined  Last PHQ-9 score on record =   PHQ-9 SCORE 10/28/2022   PHQ-9 Total Score 1     Last GAD7 score on record =   MEDINA-7 SCORE 10/28/2022   Total Score 0     Alcohol Score = 0    HEALTH MAINTENANCE:  Cholesterol: (  Cholesterol   Date Value Ref Range Status   2021 169 <200 mg/dL Final      Last Mammo: Not applicable, Result: Not applicable, Next Mammo: Due at age 38   Pap: (  Lab Results   Component Value Date    PAP NIL 2018      Colonoscopy:  N/A, Result: Not applicable, Next Colonoscopy: age 43  Dexa:  N/A    Health maintenance updated:  yes    HISTORY:  OB History    Para Term  AB Living   3 2 2 0 1 2   SAB IAB Ectopic Multiple Live Births   1 0 0 0 2      # Outcome Date GA Lbr Ashish/2nd Weight Sex Delivery Anes PTL Lv   3 Term 16 39w2d  3.385 kg (7 lb 7.4 oz) F CS-LTranv Spinal  VONDA      Birth Comments: followed and delivered by [facility]. planned repeat c/s. no complications      Name: Lavern      Apgar1: 9  Apgar5: 9   2 Term 16 40w0d 07:20 / 05:29 3.57 kg (7 lb 13.9 oz) M CS-LTranv EPI N VONDA      Birth Comments: forebag SROM so then pitocin induction and then finally AROM of remainder of bag. 4 to complete quickly. labored down 2 hrs then pushed 2 hrs adn no descent past +1 and then fetal tachy. at time of c/s head was felt to be CPD and OP      Name: Dustin      Apgar1: 6  Apgar5: 8   1 SAB 09/01/15     SAB          Patient Active Problem List   Diagnosis     Cold sore     Oral contraceptive pill surveillance     Past Surgical History:   Procedure Laterality Date      SECTION N/A 2016    Procedure:  SECTION;  Surgeon: Debby Sen MD;  Location:  [facility]      SECTION N/A 2018    Procedure: REPEAT  SECTION;  Surgeon: Debby Sen MD;  Location:  [facility]      Social History     Tobacco Use     Smoking status: Never     Smokeless tobacco: Never   Substance Use Topics     Alcohol use: No      "Alcohol/week: 0.0 standard drinks      Problem (# of Occurrences) Relation (Name,Age of Onset)    Heart Disease (1) Maternal Grandfather    Breast Cancer (3) Paternal Grandmother, Maternal Aunt, Other: PGGM    Prostate Cancer (1) Paternal Uncle (50)    Aneurysm (2) Maternal Grandmother (80), Paternal Grandfather    Ovarian Cancer (2) Paternal Grandmother, Maternal Aunt            Current Outpatient Medications   Medication Sig     albuterol (PROAIR HFA/PROVENTIL HFA/VENTOLIN HFA) 108 (90 Base) MCG/ACT inhaler INHALE 2 PUFFS BY MOUTH EVERY 4 HOURS AS NEEDED     norethindrone-ethinyl estradiol-iron (MICROGESTIN FE1.5/30) 1.5-30 MG-MCG tablet Take 1 tablet by mouth daily     valACYclovir (VALTREX) 1000 mg tablet TAKE 2 TABLETS BY MOUTH TWICE DAILY FOR 1 DAY     No current facility-administered medications for this visit.     Allergies   Allergen Reactions     Seasonal Allergies        Past medical, surgical, social and family histories were reviewed and updated in EPIC.    ROS:   12 point review of systems negative other than symptoms noted below or in the HPI.  No urinary frequency or dysuria, bladder or kidney problems, Normal menstrual cycles    EXAM:  /62   Ht 1.702 m (5' 7\")   Wt 87.2 kg (192 lb 3.2 oz)   LMP 10/19/2022   BMI 30.10 kg/m     BMI: Body mass index is 30.1 kg/m .    PHYSICAL EXAM:  Constitutional:   Appearance: Well nourished, well developed, alert, in no acute distress  Neck:  Lymph Nodes:  No lymphadenopathy present    Thyroid:  Gland size normal, nontender, no nodules or masses present  on palpation  Chest:  Respiratory Effort:  Breathing unlabored, CTA Bilaterally  Cardiovascular:    Heart: Auscultation:  Regular rate, normal rhythm, no murmurs present  Breasts: Inspection of Breasts:  No lymphadenopathy present., Palpation of Breasts and Axillae:  No masses present on palpation, no breast tenderness., Axillary Lymph Nodes:  No lymphadenopathy present. and No nodularity, asymmetry or " nipple discharge bilaterally.  Gastrointestinal:   Abdominal Examination:  Abdomen nontender to palpation, tone normal without rigidity or guarding, no masses present, umbilicus without lesions   Liver and Spleen:  No hepatomegaly present, liver nontender to palpation    Hernias:  No hernias present  Lymphatic: Lymph Nodes:  No other lymphadenopathy present  Skin:  General Inspection:  No rashes present, no lesions present, no areas of  discoloration  Neurologic:    Mental Status:  Oriented X3.  Normal strength and tone, sensory exam                grossly normal, mentation intact and speech normal.    Psychiatric:   Mentation appears normal and affect normal/bright.         Pelvic Exam:  External Genitalia:     Normal appearance for age, no discharge present, no tenderness present, no inflammatory lesions present, color normal, on exam, found a likely very soft lipoma vs fluid filled cyst, though this is less likely,on her inner right thigh. Ping-pong ball sized. Asymptomatic.  Vagina:     Normal vaginal vault without central or paravaginal defects, no discharge present, no inflammatory lesions present, no masses present  Bladder:     Nontender to palpation  Urethra:   Urethral Body:  Urethra palpation normal, urethra structural support normal   Urethral Meatus:  No erythema or lesions present  Cervix:     Appearance healthy, no lesions present, nontender to palpation, no bleeding present  Uterus:     Uterus: firm, normal sized and nontender, mildly retroverted in position.   Adnexa:     No adnexal tenderness present, no adnexal masses present  Perineum:     Perineum within normal limits, no evidence of trauma, no rashes or skin lesions present  Anus:     Anus within normal limits, no hemorrhoids present  Inguinal Lymph Nodes:     No lymphadenopathy present  Pubic Hair:     Normal pubic hair distribution for age  Genitalia and Groin:     No rashes present, no lesions present, no areas of discoloration, no masses  present      COUNSELING:   Reviewed preventive health counseling, as reflected in patient instructions  Special attention given to:        Regular exercise       Healthy diet/nutrition       Immunizations    Vaccinated for: Influenza and Bivalent Covid Booster       Contraception    BMI: Body mass index is 30.1 kg/m .  Weight management plan: Discussed healthy diet and exercise guidelines    ASSESSMENT:  37 year old female with satisfactory annual exam.    ICD-10-CM    1. Encounter for gynecological examination without abnormal finding  Z01.419 Pap thin layer screen with HPV - recommended age 30 - 65 years     HPV Hold (Lab Only)     HPV High Risk Types DNA Cervical      2. Oral contraceptive pill surveillance  Z30.41       3. Need for prophylactic vaccination and inoculation against influenza  Z23 INFLUENZA VACCINE IM > 6 MONTHS VALENT IIV4 (AFLURIA/FLUZONE)      4. High priority for 2019-nCoV vaccine  Z23 COVID-19,PF,MODERNA BIVALENT 18+Yrs      5. Cold sore  B00.1           PLAN:  Pap/hpv completed today  Should plan full fasting labs in 1-2 yrs     Couldn't fit patient in for mammo today and given her fhx she could have started earier mammos as discussed. Is due regardless in 4 months when she turns 38.  Will try to get one asap and then will continue with them annual so that it can sync up with her annual exams with me.    Reviewed weight, exercise, calorie intake and output, basal metabolic rates and changes after age 40.  Discussed regular exercise at least 4-5x/week for 30 min. Discussed muscle confusion but even digestive confusion in the sense of not gravitating towards the exact same foods, etc.  Discussed strength training and weights and all of that is great but needs to do intensity with that to get HR elevated to help offset metabolic slowlng at this age, as that will help with weight loss more than just stationary weight lifting, ie)Hiit, etc     Asymptomatic lipoma other than with some weight loss  it is more pronounced and b/c of exercise and it's location/friction is just more aware of it.  Can certainly refer her to gen surgery or derm to have it excised but this is likely to cause more impacts on working out with down time and healing and more risk with infection, pain, etc.  Since not actually hurting and very soft but less likely a cyst, would recommend no intervention unless grows or becomes sx and patient is agreeable to this    Reviewed pros/cons/s.e of COCs as well as increased effectiveness compared to minipill when not breastfeeding.  5 yrs on KAYLIE's can help decrease ovarian cancer by 50% and has not been shown to increase breast cancer risk.  Will have her switch to lo-estrin 1.5/30 once finishes out her current pack  If has any issues with cycle control or s.e will contact me sooner, o/w 1 yr of ocp Rx sent in      Debby Sen MD

## 2022-10-28 ENCOUNTER — OFFICE VISIT (OUTPATIENT)
Dept: OBGYN | Facility: CLINIC | Age: 37
End: 2022-10-28
Payer: COMMERCIAL

## 2022-10-28 VITALS
SYSTOLIC BLOOD PRESSURE: 110 MMHG | DIASTOLIC BLOOD PRESSURE: 62 MMHG | WEIGHT: 192.2 LBS | BODY MASS INDEX: 30.17 KG/M2 | HEIGHT: 67 IN

## 2022-10-28 DIAGNOSIS — Z23 NEED FOR PROPHYLACTIC VACCINATION AND INOCULATION AGAINST INFLUENZA: ICD-10-CM

## 2022-10-28 DIAGNOSIS — Z30.41 ORAL CONTRACEPTIVE PILL SURVEILLANCE: ICD-10-CM

## 2022-10-28 DIAGNOSIS — Z23 HIGH PRIORITY FOR 2019-NCOV VACCINE: ICD-10-CM

## 2022-10-28 DIAGNOSIS — Z01.419 ENCOUNTER FOR GYNECOLOGICAL EXAMINATION WITHOUT ABNORMAL FINDING: Primary | ICD-10-CM

## 2022-10-28 DIAGNOSIS — B00.1 COLD SORE: ICD-10-CM

## 2022-10-28 PROCEDURE — 90686 IIV4 VACC NO PRSV 0.5 ML IM: CPT | Performed by: OBSTETRICS & GYNECOLOGY

## 2022-10-28 PROCEDURE — 91313 COVID-19,PF,MODERNA BIVALENT: CPT | Performed by: OBSTETRICS & GYNECOLOGY

## 2022-10-28 PROCEDURE — 90471 IMMUNIZATION ADMIN: CPT | Performed by: OBSTETRICS & GYNECOLOGY

## 2022-10-28 PROCEDURE — 0134A COVID-19,PF,MODERNA BIVALENT: CPT | Performed by: OBSTETRICS & GYNECOLOGY

## 2022-10-28 PROCEDURE — 99395 PREV VISIT EST AGE 18-39: CPT | Mod: 25 | Performed by: OBSTETRICS & GYNECOLOGY

## 2022-10-28 PROCEDURE — 87624 HPV HI-RISK TYP POOLED RSLT: CPT | Performed by: OBSTETRICS & GYNECOLOGY

## 2022-10-28 PROCEDURE — G0145 SCR C/V CYTO,THINLAYER,RESCR: HCPCS | Performed by: OBSTETRICS & GYNECOLOGY

## 2022-10-28 RX ORDER — NORETHINDRONE ACETATE AND ETHINYL ESTRADIOL 1.5-30(21)
1 KIT ORAL DAILY
Qty: 84 TABLET | Refills: 3 | Status: SHIPPED | OUTPATIENT
Start: 2022-10-28 | End: 2023-07-20

## 2022-10-28 RX ORDER — ACETAMINOPHEN AND CODEINE PHOSPHATE 120; 12 MG/5ML; MG/5ML
SOLUTION ORAL
Qty: 84 TABLET | Refills: 0 | Status: CANCELLED | OUTPATIENT
Start: 2022-10-28

## 2022-10-28 ASSESSMENT — ANXIETY QUESTIONNAIRES
3. WORRYING TOO MUCH ABOUT DIFFERENT THINGS: NOT AT ALL
5. BEING SO RESTLESS THAT IT IS HARD TO SIT STILL: NOT AT ALL
6. BECOMING EASILY ANNOYED OR IRRITABLE: NOT AT ALL
2. NOT BEING ABLE TO STOP OR CONTROL WORRYING: NOT AT ALL
IF YOU CHECKED OFF ANY PROBLEMS ON THIS QUESTIONNAIRE, HOW DIFFICULT HAVE THESE PROBLEMS MADE IT FOR YOU TO DO YOUR WORK, TAKE CARE OF THINGS AT HOME, OR GET ALONG WITH OTHER PEOPLE: NOT DIFFICULT AT ALL
2. NOT BEING ABLE TO STOP OR CONTROL WORRYING: NOT AT ALL
GAD7 TOTAL SCORE: 0
IF YOU CHECKED OFF ANY PROBLEMS ON THIS QUESTIONNAIRE, HOW DIFFICULT HAVE THESE PROBLEMS MADE IT FOR YOU TO DO YOUR WORK, TAKE CARE OF THINGS AT HOME, OR GET ALONG WITH OTHER PEOPLE: NOT DIFFICULT AT ALL
GAD7 TOTAL SCORE: 0
GAD7 TOTAL SCORE: 0
3. WORRYING TOO MUCH ABOUT DIFFERENT THINGS: NOT AT ALL
7. FEELING AFRAID AS IF SOMETHING AWFUL MIGHT HAPPEN: NOT AT ALL
1. FEELING NERVOUS, ANXIOUS, OR ON EDGE: NOT AT ALL
1. FEELING NERVOUS, ANXIOUS, OR ON EDGE: NOT AT ALL
5. BEING SO RESTLESS THAT IT IS HARD TO SIT STILL: NOT AT ALL
6. BECOMING EASILY ANNOYED OR IRRITABLE: NOT AT ALL
7. FEELING AFRAID AS IF SOMETHING AWFUL MIGHT HAPPEN: NOT AT ALL

## 2022-10-28 ASSESSMENT — PATIENT HEALTH QUESTIONNAIRE - PHQ9
SUM OF ALL RESPONSES TO PHQ QUESTIONS 1-9: 1
5. POOR APPETITE OR OVEREATING: NOT AT ALL
5. POOR APPETITE OR OVEREATING: NOT AT ALL

## 2022-11-01 LAB
BKR LAB AP GYN ADEQUACY: NORMAL
BKR LAB AP GYN INTERPRETATION: NORMAL
BKR LAB AP HPV REFLEX: NORMAL
BKR LAB AP PREVIOUS ABNORMAL: NORMAL
PATH REPORT.COMMENTS IMP SPEC: NORMAL
PATH REPORT.COMMENTS IMP SPEC: NORMAL
PATH REPORT.RELEVANT HX SPEC: NORMAL

## 2022-11-03 LAB
HUMAN PAPILLOMA VIRUS 16 DNA: NEGATIVE
HUMAN PAPILLOMA VIRUS 18 DNA: NEGATIVE
HUMAN PAPILLOMA VIRUS FINAL DIAGNOSIS: NORMAL
HUMAN PAPILLOMA VIRUS OTHER HR: NEGATIVE

## 2022-11-20 PROBLEM — Z30.41 ORAL CONTRACEPTIVE PILL SURVEILLANCE: Status: ACTIVE | Noted: 2022-11-20

## 2022-12-16 ENCOUNTER — ANCILLARY PROCEDURE (OUTPATIENT)
Dept: MAMMOGRAPHY | Facility: CLINIC | Age: 37
End: 2022-12-16
Payer: COMMERCIAL

## 2022-12-16 DIAGNOSIS — Z12.31 VISIT FOR SCREENING MAMMOGRAM: ICD-10-CM

## 2022-12-16 PROCEDURE — 77067 SCR MAMMO BI INCL CAD: CPT | Mod: TC | Performed by: RADIOLOGY

## 2023-03-01 DIAGNOSIS — B00.1 COLD SORE: ICD-10-CM

## 2023-03-01 RX ORDER — VALACYCLOVIR HYDROCHLORIDE 1 G/1
TABLET, FILM COATED ORAL
Qty: 12 TABLET | Refills: 3 | Status: SHIPPED | OUTPATIENT
Start: 2023-03-01 | End: 2023-10-30

## 2023-03-01 NOTE — TELEPHONE ENCOUNTER
"Requested Prescriptions   Pending Prescriptions Disp Refills     valACYclovir (VALTREX) 1000 mg tablet [Pharmacy Med Name: VALACYCLOVIR 1GM TABLETS] 12 tablet 3     Sig: TAKE 2 TABLETS BY MOUTH TWICE DAILY FOR 1 DAY       Antivirals for Herpes Protocol Failed - 3/1/2023  1:42 PM        Failed - Normal serum creatinine on file in past 12 months     No lab results found.    Ok to refill medication if creatinine is low          Passed - Patient is age 12 or older        Passed - Recent (12 mo) or future (30 days) visit within the authorizing provider's specialty     Patient has had an office visit with the authorizing provider or a provider within the authorizing providers department within the previous 12 mos or has a future within next 30 days. See \"Patient Info\" tab in inbasket, or \"Choose Columns\" in Meds & Orders section of the refill encounter.              Passed - Medication is active on med list           Last Written Prescription Date:  5/21/21  Last Fill Quantity: 12,  # refills: 3   Last office visit: 10/28/2022 with prescribing provider:  DR Sen   Future Office Visit:      Prescription approved per [facility] Refill Protocol.  Rhea Sarmiento RN on 3/1/2023 at 1:44 PM        "

## 2023-07-20 DIAGNOSIS — Z78.9 USES BIRTH CONTROL: Primary | ICD-10-CM

## 2023-07-20 RX ORDER — NORETHINDRONE ACETATE AND ETHINYL ESTRADIOL AND FERROUS FUMARATE 1.5-30(21)
KIT ORAL
Qty: 84 TABLET | Refills: 0 | Status: SHIPPED | OUTPATIENT
Start: 2023-07-20 | End: 2023-10-17

## 2023-07-20 NOTE — TELEPHONE ENCOUNTER
"Requested Prescriptions   Pending Prescriptions Disp Refills     JUNEL FE 1.5/30 1.5-30 MG-MCG tablet [Pharmacy Med Name: JUNEL FE 1.5/30 TABLETS 28S] 84 tablet 3     Sig: TAKE 1 TABLET BY MOUTH DAILY       Contraceptives Protocol Passed - 7/20/2023  8:03 AM        Passed - Patient is not a current smoker if age is 35 or older        Passed - Recent (12 mo) or future (30 days) visit within the authorizing provider's specialty     Patient has had an office visit with the authorizing provider or a provider within the authorizing providers department within the previous 12 mos or has a future within next 30 days. See \"Patient Info\" tab in inbasket, or \"Choose Columns\" in Meds & Orders section of the refill encounter.              Passed - Medication is active on med list        Passed - No active pregnancy on record        Passed - No positive pregnancy test in past 12 months           Last Written Prescription Date:  10/28/22  Last Fill Quantity: 84,  # refills: 3   Last office visit: 10/28/2022 ; last virtual visit: Visit date not found with prescribing provider:  Dr Sen   Future Office Visit:      Prescription approved per [facility] Refill Protocol.  Rhea Sarmiento RN on 7/20/2023 at 9:06 AM        "

## 2023-10-17 ENCOUNTER — MYC REFILL (OUTPATIENT)
Dept: OBGYN | Facility: CLINIC | Age: 38
End: 2023-10-17
Payer: COMMERCIAL

## 2023-10-17 DIAGNOSIS — Z78.9 USES BIRTH CONTROL: ICD-10-CM

## 2023-10-17 DIAGNOSIS — J45.20 MILD INTERMITTENT ASTHMA WITHOUT COMPLICATION: Primary | ICD-10-CM

## 2023-10-18 RX ORDER — NORETHINDRONE ACETATE AND ETHINYL ESTRADIOL 1.5-30(21)
1 KIT ORAL DAILY
Qty: 84 TABLET | Refills: 0 | Status: SHIPPED | OUTPATIENT
Start: 2023-10-18 | End: 2023-10-30

## 2023-10-18 NOTE — TELEPHONE ENCOUNTER
"Requested Prescriptions   Pending Prescriptions Disp Refills    norethindrone-ethinyl estradiol-iron (JUNEL FE 1.5/30) 1.5-30 MG-MCG tablet 84 tablet 0     Sig: Take 1 tablet by mouth daily       Contraceptives Protocol Passed - 10/17/2023  5:09 PM        Passed - Patient is not a current smoker if age is 35 or older        Passed - Recent (12 mo) or future (30 days) visit within the authorizing provider's specialty     Patient has had an office visit with the authorizing provider or a provider within the authorizing providers department within the previous 12 mos or has a future within next 30 days. See \"Patient Info\" tab in inbasket, or \"Choose Columns\" in Meds & Orders section of the refill encounter.              Passed - Medication is active on med list        Passed - No active pregnancy on record        Passed - No positive pregnancy test in past 12 months           Last Written Prescription Date:  7/20/23  Last Fill Quantity: 84,  # refills: 0   Last office visit: 10/28/2022 ; last virtual visit: Visit date not found with prescribing provider:  [facility]   Future Office Visit:  10/30/23 with Dr. Sen    Medication is being filled for 1 time refill only due to:  Patient needs to be seen because due for annual.  Appointment scheduled.  Adrienne Simmons RN on 10/18/2023 at 6:14 AM        "

## 2023-10-18 NOTE — TELEPHONE ENCOUNTER
"Requested Prescriptions   Pending Prescriptions Disp Refills    albuterol (PROAIR HFA/PROVENTIL HFA/VENTOLIN HFA) 108 (90 Base) MCG/ACT inhaler 18 g      Sig: Inhale 2 puffs into the lungs every 4 hours as needed       Asthma Maintenance Inhalers - Anticholinergics Failed - 10/17/2023  5:10 PM        Failed - Asthma control assessment score within normal limits in last 6 months     Please review ACT score.           Passed - Patient is age 12 years or older        Passed - Medication is active on med list        Passed - Recent (6 mo) or future (30 days) visit within the authorizing provider's specialty     Patient had office visit in the last 6 months or has a visit in the next 30 days with authorizing provider or within the authorizing provider's specialty.  See \"Patient Info\" tab in inbasket, or \"Choose Columns\" in Meds & Orders section of the refill encounter.           Short-Acting Beta Agonist Inhalers Protocol  Failed - 10/17/2023  5:10 PM        Failed - Asthma control assessment score within normal limits in last 6 months     Please review ACT score.           Passed - Patient is age 12 or older        Passed - Medication is active on med list        Passed - Recent (6 mo) or future (30 days) visit within the authorizing provider's specialty     Patient had office visit in the last 6 months or has a visit in the next 30 days with authorizing provider or within the authorizing provider's specialty.  See \"Patient Info\" tab in inbasket, or \"Choose Columns\" in Meds & Orders section of the refill encounter.               Last Written Prescription Date:  5/7/21  Last Fill Quantity: -,  # refills: -   Last office visit: 10/28/2022 ; last virtual visit: Visit date not found with prescribing provider:  [facility]   Future Office Visit:  10/30/23 with Dr. Sen    Routing refill request to provider for review/approval because:  A break in medication.      Adrienne Simmons RN on 10/18/2023 at 6:18 AM            "

## 2023-10-23 RX ORDER — ALBUTEROL SULFATE 90 UG/1
2 AEROSOL, METERED RESPIRATORY (INHALATION) EVERY 4 HOURS PRN
Qty: 18 G | Refills: 0 | Status: SHIPPED | OUTPATIENT
Start: 2023-10-23 | End: 2023-11-13

## 2023-10-24 NOTE — PROGRESS NOTES
Sherie is a 38 year old  female who presents for annual exam.     Besides routine health maintenance,  she would like to discuss URI.    HPI:  The patient's PCP is Dr. Debby Sen MD.  Fasting labs     Pt presents for her annual exam.     Has been sick for the past 20 days and tested, negative for covid, pneumonia, strep after traveling to [place]. Today she feels better and that she is on an up swing but has been waking up with a sore throat that lasts all day and Ibuprofen has been the only medication that alleviates the pain. Denies fever at any point.  Upon personal inspection, saw erythema, and white nodules in her throat and mentioned pain throughout her neck. Traveling on Wednesday to [place].     Mentioned itching throughout perineal area. A year or two she had itching, red, oily, raw, area that eventually scabbed. Experiences itching post coitally and after showering. Also has a h.o perioral dermatitis and eczema?? around the base of her neck. Was rx'd both a topical and oral antibiotic for tx and thought it might have been an antifungal but was generally unable to recall specific name of tx. It helped her face/neck area and unsure if this is the same issue as what's happening on the vulva    Still on ocp and periods are very light where she only requires one tampon. Denies breakthrough spotting. No s.e.Inquired about onset of perimenopause and stated that she feels paranoid about onset of sx bc her older half sister had such a bad time with v.m sx but mostly mood issues     Inquired about thermography in addition or instead of mammo and if it plays a role or not.     Weight has been stable since  and has been consistently 10#'s down since 2020. Is exercising regularly at least 4x/week and trying to overall eat healthy and be conscious of sugar and alcohol. Can't seem to lose more than this and current BMI is 30. However overall feels healthy and strong      GYNECOLOGIC  HISTORY:    Patient's last menstrual period was 10/16/2023 (approximate).    Regular menses? yes  Menses every 28 days.  Length of menses: 4 days    Her current contraception method is: oral contraceptives.  She  reports that she has never smoked. She has never used smokeless tobacco.    Patient is sexually active.  STD testing offered?  Declined  Last PHQ-9 score on record =       10/28/2022    11:18 AM   PHQ-9 SCORE   PHQ-9 Total Score 1     Last GAD7 score on record =       10/28/2022    11:18 AM   MEDINA-7 SCORE   Total Score 0     Alcohol Score =     HEALTH MAINTENANCE:  Cholesterol:   Recent Labs   Lab Test 21  0809   CHOL 169   HDL 69   LDL 83   TRIG 85     Last Mammo:  2022 , Result: Normal, Next Mammo:  2023  Pap:   Lab Results   Component Value Date    GYNINTERP  10/28/2022     Negative for Intraepithelial Lesion or Malignancy (NILM)    PAP NIL 2018     Colonoscopy:  NA, Result: Not applicable, Next Colonoscopy: 45 years.  Dexa:  NA    Health maintenance updated:  Yes    HISTORY:  OB History    Para Term  AB Living   3 2 2 0 1 2   SAB IAB Ectopic Multiple Live Births   1 0 0 0 2      # Outcome Date GA Lbr Ashish/2nd Weight Sex Delivery Anes PTL Lv   3 Term 18 39w2d  3.385 kg (7 lb 7.4 oz) F CS-LTranv Spinal  VONDA      Birth Comments: followed and delivered by [facility]. planned repeat c/s. no complications      Name: Lavern      Apgar1: 9  Apgar5: 9   2 Term 16 40w0d 07:20 / 05:29 3.57 kg (7 lb 13.9 oz) M CS-LTranv EPI N VONDA      Birth Comments: forebag SROM so then pitocin induction and then finally AROM of remainder of bag. 4 to complete quickly. labored down 2 hrs then pushed 2 hrs adn no descent past +1 and then fetal tachy. at time of c/s head was felt to be CPD and OP      Name: Dustin      Apgar1: 6  Apgar5: 8   1 SAB 09/01/15     SAB          Patient Active Problem List   Diagnosis    Cold sore    Oral contraceptive pill surveillance    Lichen sclerosus et  "atrophicus of the vulva     Past Surgical History:   Procedure Laterality Date     SECTION N/A 2016    Procedure:  SECTION;  Surgeon: Debby Sen MD;  Location:  [facility]     SECTION N/A 2018    Procedure: REPEAT  SECTION;  Surgeon: Debby Sen MD;  Location:  [facility]      Social History     Tobacco Use    Smoking status: Never    Smokeless tobacco: Never   Substance Use Topics    Alcohol use: No     Alcohol/week: 0.0 standard drinks of alcohol      Problem (# of Occurrences) Relation (Name,Age of Onset)    Heart Disease (1) Maternal Grandfather    Breast Cancer (3) Paternal Grandmother, Maternal Aunt, Other: [facility]    Prostate Cancer (1) Paternal Uncle (50)    Aneurysm (2) Maternal Grandmother (80), Paternal Grandfather    Ovarian Cancer (2) Paternal Grandmother, Maternal Aunt              Current Outpatient Medications   Medication Sig    norethindrone-ethinyl estradiol-iron (JUNEL FE ) 1.5-30 MG-MCG tablet Take 1 tablet by mouth daily    valACYclovir (VALTREX) 1000 mg tablet TAKE 2 TABLETS BY MOUTH TWICE DAILY FOR 1 DAY    albuterol (PROAIR HFA/PROVENTIL HFA/VENTOLIN HFA) 108 (90 Base) MCG/ACT inhaler INHALE 2 PUFFS INTO THE LUNGS EVERY 4 HOURS AS NEEDED FOR WHEEZING     No current facility-administered medications for this visit.     Allergies   Allergen Reactions    Seasonal Allergies        Past medical, surgical, social and family histories were reviewed and updated in EPIC.    EXAM:  /80   Ht 1.702 m (5' 7\")   Wt 87.5 kg (193 lb)   LMP 10/16/2023 (Approximate)   Breastfeeding No   BMI 30.23 kg/m     BMI: Body mass index is 30.23 kg/m .    PHYSICAL EXAM:  Constitutional:   Appearance: Well nourished, well developed, alert, in no acute distress  Neck:  Lymph Nodes:  No lymphadenopathy present    Thyroid:  Gland size normal, nontender, no nodules or masses present  on palpation  Chest:  Respiratory Effort:  Breathing unlabored, CTA " B  Cardiovascular:    Heart: Auscultation:  Regular rate, normal rhythm, no murmurs present  Breasts: Inspection of Breasts:  No lymphadenopathy present., Palpation of Breasts and Axillae:  No masses present on palpation, no breast tenderness., Axillary Lymph Nodes:  No lymphadenopathy present., and No nodularity, asymmetry or nipple discharge bilaterally.  Gastrointestinal:   Abdominal Examination:  Abdomen nontender to palpation, tone normal without rigidity or guarding, no masses present, umbilicus without lesions   Liver and Spleen:  No hepatomegaly present, liver nontender to palpation    Hernias:  No hernias present  Lymphatic: Lymph Nodes:  NECK LYMPHADENOPATHY D/T RECENT ILLNESS, VERY MILD ERYTHEMA IN THROAT BUT NO WHITE SPOTS OR EVIDENCE OF STREP  Skin:  General Inspection:  No rashes present, no lesions present, no areas of  discoloration  Neurologic:    Mental Status:  Oriented X3.  Normal strength and tone, sensory exam                grossly normal, mentation intact and speech normal.    Psychiatric:   Mentation appears normal and affect normal/bright.         Pelvic Exam:  External Genitalia:     Normal appearance for age, no discharge present, no tenderness present, no inflammatory lesions present, color normal  Vagina:     Normal vaginal vault without central or paravaginal defects, no discharge present, no inflammatory lesions present, no masses present  Bladder:     Nontender to palpation  Urethra:   Urethral Body:  Urethra palpation normal, urethra structural support normal   Urethral Meatus:  No erythema or lesions present  Cervix:     Appearance healthy, no lesions present, nontender to palpation, no bleeding present  Uterus:     Uterus: firm, normal sized and nontender, anteverted in position.   Adnexa:     No adnexal tenderness present, no adnexal masses present  Perineum:     Perineum within normal limits, no evidence of trauma, WHITENING SYMMETRICALLY C/W W/ LS ON POSTERIOR FOURCHETTE  Anus:      Anus within normal limits, no hemorrhoids present  Inguinal Lymph Nodes:     No lymphadenopathy present  Pubic Hair:     Normal pubic hair distribution for age  Genitalia and Groin:     No rashes present, no lesions present, no areas of discoloration, no masses present    COUNSELING:   Reviewed preventive health counseling, as reflected in patient instructions       Contraception       (Mary)menopause management    Declined flu shot    BMI: Body mass index is 30.23 kg/m .  Reviewed diet, exercise, metabolism changes after 40, etc    ASSESSMENT:  38 year old female with satisfactory annual exam.    ICD-10-CM    1. Encounter for gynecological examination without abnormal finding  Z01.419       2. Uses birth control  Z78.9 norethindrone-ethinyl estradiol-iron (JUNEL FE 1.5/30) 1.5-30 MG-MCG tablet      3. Cold sore  B00.1 valACYclovir (VALTREX) 1000 mg tablet      4. Lichen sclerosus et atrophicus of the vulva  N90.4 clobetasol (TEMOVATE) 0.05 % external ointment      5. Sore throat  J02.9       6. Screening for cardiovascular condition  Z13.6 Lipid panel reflex to direct LDL Fasting      7. Screening for metabolic disorder  Z13.228 Comprehensive metabolic panel      8. Screening for thyroid disorder  Z13.29 TSH with free T4 reflex      9. Screening for diabetes mellitus  Z13.1 Hemoglobin A1c      10. Screening for disorder of blood and blood-forming organs  Z13.0 CBC with platelets      11. Encounter for vitamin deficiency screening  Z13.21 Vitamin D Deficiency      12. Encounter for hepatitis C screening test for low risk patient  Z11.59 Hepatitis C Screen Reflex to HCV RNA Quant and Genotype          PLAN:    Pap is UTD for 4 more years.   Can follow routine ASCCP guidelines     mammo scheduled for 12/23. Reviewed limitations of thermography and would not replace mammo with it. Not indicated in addition to mammo either, nor covered by insurance, so could certainly consider it and pay out of pocket If  "chose.    Fasting labs deferred but pended for future if chooses to return and do them before next annual. Normal when last checked      Offered robitussin with codeine for cough overnight to help with sleep and dry cough but pt declined as feeling better  Has some mild PRAVEENA from prolonged viral illness but ST in AM likely from postnasal drip and congestion drainage.  Can use throat lozenges or spray prn but seems to finally be improving and turning the corner.     Patient is doing great on her ocps with light and regular periods. No contraindicatios to continuing on them and discussed pros/cons/s.e and refills sent for the year.  Reviewed overall cancer increases and decreases with ocps vs HRT  Discussed (jennifer)menopause and typical course of v.m sx, metabolic changes and mood effects.  Reviewed that this has typically started for many by age 45-47 but can be even sooner for some, though later for others.   Assured her that with ocp, sx are being masked and until they are stopped in early 50s. Per NAMS can be on them until age 52-55 or other contraindication develops.  Would treat sx of perimenopause/weight/attention/focus when and if they developed, or V.M sx but reviewed the lack of clinically useful information from \"checking hormones\"    Discussed dx of LS and the physiology, flare/remit cycle vs chronic and worsening condition and treatment strategy.  Rx'd clobetasol to use BID for 10 days and vaseline frequently in between and in general  If has infrequent flares can just repeat that tx course, if chronic or short lived remissions then could do 2-3x/week at bedtime for prevention     20 minutes in addition to the routine annual preventative exam,  were spent on direct management of the patient's other medical issues as above as well as chart review including: imaging, lab work, previous visit notes by this provider, and other provider notes, as well as chart completion on the same DOS      Debby Sen MD    "

## 2023-10-30 ENCOUNTER — OFFICE VISIT (OUTPATIENT)
Dept: OBGYN | Facility: CLINIC | Age: 38
End: 2023-10-30
Payer: COMMERCIAL

## 2023-10-30 VITALS
DIASTOLIC BLOOD PRESSURE: 80 MMHG | WEIGHT: 193 LBS | BODY MASS INDEX: 30.29 KG/M2 | HEIGHT: 67 IN | SYSTOLIC BLOOD PRESSURE: 110 MMHG

## 2023-10-30 DIAGNOSIS — Z13.228 SCREENING FOR METABOLIC DISORDER: ICD-10-CM

## 2023-10-30 DIAGNOSIS — B00.1 COLD SORE: ICD-10-CM

## 2023-10-30 DIAGNOSIS — Z13.21 ENCOUNTER FOR VITAMIN DEFICIENCY SCREENING: ICD-10-CM

## 2023-10-30 DIAGNOSIS — Z13.6 SCREENING FOR CARDIOVASCULAR CONDITION: ICD-10-CM

## 2023-10-30 DIAGNOSIS — Z13.1 SCREENING FOR DIABETES MELLITUS: ICD-10-CM

## 2023-10-30 DIAGNOSIS — Z01.419 ENCOUNTER FOR GYNECOLOGICAL EXAMINATION WITHOUT ABNORMAL FINDING: Primary | ICD-10-CM

## 2023-10-30 DIAGNOSIS — Z13.29 SCREENING FOR THYROID DISORDER: ICD-10-CM

## 2023-10-30 DIAGNOSIS — Z11.59 ENCOUNTER FOR HEPATITIS C SCREENING TEST FOR LOW RISK PATIENT: ICD-10-CM

## 2023-10-30 DIAGNOSIS — N90.4 LICHEN SCLEROSUS ET ATROPHICUS OF THE VULVA: ICD-10-CM

## 2023-10-30 DIAGNOSIS — Z13.0 SCREENING FOR DISORDER OF BLOOD AND BLOOD-FORMING ORGANS: ICD-10-CM

## 2023-10-30 DIAGNOSIS — J02.9 SORE THROAT: ICD-10-CM

## 2023-10-30 DIAGNOSIS — Z78.9 USES BIRTH CONTROL: ICD-10-CM

## 2023-10-30 PROCEDURE — 99396 PREV VISIT EST AGE 40-64: CPT | Performed by: OBSTETRICS & GYNECOLOGY

## 2023-10-30 PROCEDURE — 99213 OFFICE O/P EST LOW 20 MIN: CPT | Mod: 25 | Performed by: OBSTETRICS & GYNECOLOGY

## 2023-10-30 RX ORDER — NORETHINDRONE ACETATE AND ETHINYL ESTRADIOL 1.5-30(21)
1 KIT ORAL DAILY
Qty: 84 TABLET | Refills: 4 | Status: SHIPPED | OUTPATIENT
Start: 2023-10-30

## 2023-10-30 RX ORDER — VALACYCLOVIR HYDROCHLORIDE 1 G/1
TABLET, FILM COATED ORAL
Qty: 12 TABLET | Refills: 3 | Status: SHIPPED | OUTPATIENT
Start: 2023-10-30

## 2023-10-30 RX ORDER — CLOBETASOL PROPIONATE 0.5 MG/G
OINTMENT TOPICAL 2 TIMES DAILY
Qty: 60 G | Refills: 0 | Status: SHIPPED | OUTPATIENT
Start: 2023-10-30 | End: 2023-11-09

## 2023-11-12 DIAGNOSIS — J45.20 MILD INTERMITTENT ASTHMA WITHOUT COMPLICATION: ICD-10-CM

## 2023-11-13 RX ORDER — ALBUTEROL SULFATE 90 UG/1
2 AEROSOL, METERED RESPIRATORY (INHALATION) EVERY 4 HOURS PRN
Qty: 8.5 G | Refills: 0 | Status: SHIPPED | OUTPATIENT
Start: 2023-11-13

## 2023-11-13 NOTE — TELEPHONE ENCOUNTER
"Requested Prescriptions   Pending Prescriptions Disp Refills    albuterol (PROAIR HFA/PROVENTIL HFA/VENTOLIN HFA) 108 (90 Base) MCG/ACT inhaler [Pharmacy Med Name: ALBUTEROL HFA INH (200 PUFFS) 8.5GM] 8.5 g      Sig: INHALE 2 PUFFS INTO THE LUNGS EVERY 4 HOURS AS NEEDED FOR WHEEZING       Asthma Maintenance Inhalers - Anticholinergics Failed - 11/12/2023  3:23 AM        Failed - Asthma control assessment score within normal limits in last 6 months     Please review ACT score.           Passed - Patient is age 12 years or older        Passed - Medication is active on med list        Passed - Recent (6 mo) or future (30 days) visit within the authorizing provider's specialty     Patient had office visit in the last 6 months or has a visit in the next 30 days with authorizing provider or within the authorizing provider's specialty.  See \"Patient Info\" tab in inbasket, or \"Choose Columns\" in Meds & Orders section of the refill encounter.           Short-Acting Beta Agonist Inhalers Protocol  Failed - 11/12/2023  3:23 AM        Failed - Asthma control assessment score within normal limits in last 6 months     Please review ACT score.           Passed - Patient is age 12 or older        Passed - Medication is active on med list        Passed - Recent (6 mo) or future (30 days) visit within the authorizing provider's specialty     Patient had office visit in the last 6 months or has a visit in the next 30 days with authorizing provider or within the authorizing provider's specialty.  See \"Patient Info\" tab in inbasket, or \"Choose Columns\" in Meds & Orders section of the refill encounter.               Last Written Prescription Date:  10/23/23  Last Fill Quantity: 18 g,  # refills: 0   Last office visit: 10/30/2023 ; last virtual visit: Visit date not found with prescribing provider:  [facility]   Future Office Visit: NONE     Prescription approved per [facility] Refill Protocol.  Adrienne Simmons RN on 11/13/2023 at 6:25 " AM

## 2023-12-12 PROBLEM — N90.4 LICHEN SCLEROSUS ET ATROPHICUS OF THE VULVA: Status: ACTIVE | Noted: 2023-12-12

## 2024-01-08 ENCOUNTER — ANCILLARY PROCEDURE (OUTPATIENT)
Dept: MAMMOGRAPHY | Facility: CLINIC | Age: 39
End: 2024-01-08
Payer: COMMERCIAL

## 2024-01-08 DIAGNOSIS — Z12.31 VISIT FOR SCREENING MAMMOGRAM: ICD-10-CM

## 2024-01-08 PROCEDURE — 77067 SCR MAMMO BI INCL CAD: CPT | Mod: TC | Performed by: STUDENT IN AN ORGANIZED HEALTH CARE EDUCATION/TRAINING PROGRAM

## 2024-10-31 RX ORDER — CLOBETASOL PROPIONATE 0.5 MG/G
OINTMENT TOPICAL
COMMUNITY
Start: 2023-11-09 | End: 2024-11-01

## 2024-10-31 NOTE — PROGRESS NOTES
Sherie is a 39 year old  female who presents for annual exam.     Besides routine health maintenance, she has no other health concerns today .    HPI:  The patient's PCP is Dr. Debby Sen MD.     Pt presents for her annual exam.     Masking today because she's getting over a head cold, but she's on the mend. Already received both flu and Covid vaccines before today's appointment and reported the s.e. of those were worse than the cold she's getting over.    Lapsed on OCPs by a week d/t pharmacy issues and started when she got them so delayed a full week. Her period came when it should in the sugar pill week but  it was much heavier than she's used to and worse cramping for several days. That was just her last period and now back on track with her pills     Had coffee with cream and a tiny bit of maple syrup but only a couple sips. Was going to do fasting labs today and forgot. However will do them today regardless.    Weight has been overall stable since  but has fluctuated up and down a bit. Was 203#  and did .  Has been doing more strength training lately, she's up 8# since last year, but feels she's been building muscle and she's feels the healthiest she's been, but she doesn't like focusing on weight, she never goes off BMI.  Does not want to turn to GLP-1s or other weight loss medication because she feels she hasn't tried everything she can yet.  Worried her OCPs could be causing slow weight gain.  Wants to weight less than 200#.      GYNECOLOGIC HISTORY:    Patient's last menstrual period was 10/18/2024 (approximate).    Regular menses? yes  Menses every 28 days.  Length of menses: 4 days    Her current contraception method is: oral contraceptives.  She  reports that she has never smoked. She has never used smokeless tobacco.    Patient is sexually active.  STD testing offered?  Declined  Last PHQ-9 score on record =       10/28/2022    11:18 AM   PHQ-9 SCORE   PHQ-9 Total Score 1     Last  GAD7 score on record =       10/28/2022    11:18 AM   MEDINA-7 SCORE   Total Score 0     Alcohol Score =     HEALTH MAINTENANCE:  Cholesterol:   Recent Labs   Lab Test 21  0809   CHOL 169   HDL 69   LDL 83   TRIG 85     Last Mammo:  24 , Result: Normal, Next Mammo:  2025  Pap:   Lab Results   Component Value Date    GYNINTERP  10/28/2022     Negative for Intraepithelial Lesion or Malignancy (NILM)    PAP NIL 2018      Colonoscopy:  NA, Result: Not applicable, Next Colonoscopy: 43 years.  Dexa:  NA    Health maintenance updated:  Yes    HISTORY:  OB History    Para Term  AB Living   3 2 2 0 1 2   SAB IAB Ectopic Multiple Live Births   1 0 0 0 2      # Outcome Date GA Lbr Ashish/2nd Weight Sex Type Anes PTL Lv   3 Term 18 39w2d  3.385 kg (7 lb 7.4 oz) F CS-LTranv Spinal  VONDA      Birth Comments: followed and delivered by [facility]. planned repeat c/s. no complications      Name: Lavern      Apgar1: 9  Apgar5: 9   2 Term 16 40w0d 07:20 / 05:29 3.57 kg (7 lb 13.9 oz) M CS-LTranv EPI N VONDA      Birth Comments: forebag SROM so then pitocin induction and then finally AROM of remainder of bag. 4 to complete quickly. labored down 2 hrs then pushed 2 hrs adn no descent past +1 and then fetal tachy. at time of c/s head was felt to be CPD and OP      Name: Dustin      Apgar1: 6  Apgar5: 8   1 SAB 09/01/15     SAB          Patient Active Problem List   Diagnosis    Cold sore    Oral contraceptive pill surveillance    Lichen sclerosus et atrophicus of the vulva     Past Surgical History:   Procedure Laterality Date     SECTION N/A 2016    Procedure:  SECTION;  Surgeon: Debby Sen MD;  Location:  [facility]     SECTION N/A 2018    Procedure: REPEAT  SECTION;  Surgeon: Debby Sen MD;  Location:  [facility]      Social History     Tobacco Use    Smoking status: Never    Smokeless tobacco: Never   Substance Use Topics    Alcohol use: No     Alcohol/week:  "0.0 standard drinks of alcohol      Problem (# of Occurrences) Relation (Name,Age of Onset)    Heart Disease (1) Maternal Grandfather    Breast Cancer (3) Paternal Grandmother, Maternal Aunt, Other: [facility]    Prostate Cancer (1) Paternal Uncle (50)    Aneurysm (2) Maternal Grandmother (80), Paternal Grandfather    Ovarian Cancer (2) Paternal Grandmother, Maternal Aunt              Current Outpatient Medications   Medication Sig Dispense Refill    albuterol (PROAIR HFA/PROVENTIL HFA/VENTOLIN HFA) 108 (90 Base) MCG/ACT inhaler INHALE 2 PUFFS INTO THE LUNGS EVERY 4 HOURS AS NEEDED FOR WHEEZING 8.5 g 0    norethindrone-ethinyl estradiol-iron (JUNEL FE 1.5/30) 1.5-30 MG-MCG tablet Take 1 tablet by mouth daily 84 tablet 4    valACYclovir (VALTREX) 1000 mg tablet TAKE 2 TABLETS BY MOUTH TWICE DAILY FOR 1 DAY 12 tablet 3     No current facility-administered medications for this visit.     Allergies   Allergen Reactions    Seasonal Allergies        Past medical, surgical, social and family histories were reviewed and updated in EPIC.    EXAM:  /68   Ht 1.702 m (5' 7\")   Wt 91.3 kg (201 lb 3.2 oz)   LMP 10/18/2024 (Approximate)   Breastfeeding No   BMI 31.51 kg/m     BMI: Body mass index is 31.51 kg/m .    PHYSICAL EXAM:  Constitutional:   Appearance: Well nourished, well developed, alert, in no acute distress  Neck:  Lymph Nodes:  No lymphadenopathy present    Thyroid:  Gland size normal, nontender, no nodules or masses present  on palpation  Chest:  Respiratory Effort:  Breathing unlabored, CTAB  Cardiovascular:    Heart: Auscultation:  Regular rate, normal rhythm, no murmurs present  Breasts: Inspection of Breasts:  No lymphadenopathy present., Palpation of Breasts and Axillae:  No masses present on palpation, no breast tenderness., Axillary Lymph Nodes:  No lymphadenopathy present., and No nodularity, asymmetry or nipple discharge bilaterally.  Gastrointestinal:   Abdominal Examination:  Abdomen nontender to " palpation, tone normal without rigidity or guarding, no masses present, umbilicus without lesions   Liver and Spleen:  No hepatomegaly present, liver nontender to palpation    Hernias:  No hernias present  Lymphatic: Lymph Nodes:  No other lymphadenopathy present  Skin:  General Inspection:  No rashes present, no lesions present, no areas of  discoloration  Neurologic:    Mental Status:  Oriented X3.  Normal strength and tone, sensory exam                grossly normal, mentation intact and speech normal.    Psychiatric:   Mentation appears normal and affect normal/bright.         Pelvic Exam:  External Genitalia:     Normal appearance for age, no discharge present, no tenderness present, no inflammatory lesions present, color normal  Vagina:     Normal vaginal vault without central or paravaginal defects, no discharge present, no inflammatory lesions present, no masses present  Bladder:     Nontender to palpation  Urethra:   Urethral Body:  Urethra palpation normal, urethra structural support normal   Urethral Meatus:  No erythema or lesions present  Cervix:     Appearance healthy, no lesions present, nontender to palpation, no bleeding present  Uterus:     Uterus: firm, normal sized and nontender, anteverted in position.   Adnexa:     No adnexal tenderness present, no adnexal masses present  Perineum:     Perineum within normal limits, no evidence of trauma, no rashes or skin lesions present  Anus:     Anus within normal limits, no hemorrhoids present  Inguinal Lymph Nodes:     No lymphadenopathy present  Pubic Hair:     Normal pubic hair distribution for age  Genitalia and Groin:     No rashes present, no lesions present, no areas of discoloration, no masses present    COUNSELING:   Reviewed preventive health counseling, as reflected in patient instructions       Regular exercise       Healthy diet/nutrition       Contraception       (Mary)menopause management    BMI: Body mass index is 31.51 kg/m .  Weight  "management plan: Discussed healthy diet and exercise guidelines    ASSESSMENT:  39 year old female with satisfactory annual exam.    ICD-10-CM    1. Encounter for gynecological examination without abnormal finding  Z01.419       2. Uses birth control  Z78.9       3. Cold sore  B00.1       4. Lichen sclerosus et atrophicus of the vulva  N90.4       5. Class 1 obesity due to excess calories without serious comorbidity with body mass index (BMI) of 31.0 to 31.9 in adult  E66.811     E66.09     Z68.31           PLAN:  Pap is UTD for 3 more years.   Can follow routine ASCCP guidelines   Discussed pap smear vs pelvic exam and ASCCP pap smear guidelines.    Mammo completed 1/2024 was normal, due again 1/2025.  Offered to Rx OCP extension in 1 year so that next year she can complete her mammo and annual visit on the same day.    Fasting labs today.  Also due for one or both, Hep C/HIV screening so will do that at the same time.  Will address any abnormalities once results are back.      Additional health issues addressed at today's visit include:     Patient is doing great on her ocps with light and regular periods. No contraindicatios to continuing on them and discussed pros/cons/s.e and refills sent for the year.  Reviewed overall cancer increases and decreases with ocps vs HRT  Discussed (jennifer)menopause and typical course of v.m sx, metabolic changes and mood effects.  Reviewed that this has typically started for many by age 45-47 but can be even sooner for some, though later for others.   Assured her that with ocp, sx are being masked and until they are stopped in early 50s. Per NAMS can be on them until age 52-55 or other contraindication develops.  Would treat sx of perimenopause/weight/attention/focus when and if they developed, or V.M sx but reviewed the lack of clinically useful information from \"checking hormones\"    Also discussed possible OCP s.e. and advised patient it is likely not causing weight gain given " patient's Hx.  Discussed metabolism changes with age and GLP-1 medications.    Patient is doing well with Valtrex and is agreeable to continue.  Rx Valtrex sent    ***     Debby Sen MD

## 2024-11-01 ENCOUNTER — OFFICE VISIT (OUTPATIENT)
Dept: OBGYN | Facility: CLINIC | Age: 39
End: 2024-11-01
Payer: COMMERCIAL

## 2024-11-01 VITALS
SYSTOLIC BLOOD PRESSURE: 100 MMHG | BODY MASS INDEX: 31.58 KG/M2 | HEIGHT: 67 IN | WEIGHT: 201.2 LBS | DIASTOLIC BLOOD PRESSURE: 68 MMHG

## 2024-11-01 DIAGNOSIS — Z78.9 USES BIRTH CONTROL: ICD-10-CM

## 2024-11-01 DIAGNOSIS — E66.09 CLASS 1 OBESITY DUE TO EXCESS CALORIES WITHOUT SERIOUS COMORBIDITY WITH BODY MASS INDEX (BMI) OF 31.0 TO 31.9 IN ADULT: ICD-10-CM

## 2024-11-01 DIAGNOSIS — Z01.419 ENCOUNTER FOR GYNECOLOGICAL EXAMINATION WITHOUT ABNORMAL FINDING: Primary | ICD-10-CM

## 2024-11-01 DIAGNOSIS — B00.1 COLD SORE: ICD-10-CM

## 2024-11-01 DIAGNOSIS — Z13.1 SCREENING FOR DIABETES MELLITUS: ICD-10-CM

## 2024-11-01 DIAGNOSIS — N90.4 LICHEN SCLEROSUS ET ATROPHICUS OF THE VULVA: ICD-10-CM

## 2024-11-01 DIAGNOSIS — E66.811 CLASS 1 OBESITY DUE TO EXCESS CALORIES WITHOUT SERIOUS COMORBIDITY WITH BODY MASS INDEX (BMI) OF 31.0 TO 31.9 IN ADULT: ICD-10-CM

## 2024-11-01 DIAGNOSIS — Z11.59 ENCOUNTER FOR HEPATITIS C SCREENING TEST FOR LOW RISK PATIENT: ICD-10-CM

## 2024-11-01 DIAGNOSIS — Z13.0 SCREENING FOR DISORDER OF BLOOD AND BLOOD-FORMING ORGANS: ICD-10-CM

## 2024-11-01 DIAGNOSIS — Z13.228 SCREENING FOR METABOLIC DISORDER: ICD-10-CM

## 2024-11-01 DIAGNOSIS — Z13.21 ENCOUNTER FOR VITAMIN DEFICIENCY SCREENING: ICD-10-CM

## 2024-11-01 DIAGNOSIS — Z13.29 SCREENING FOR THYROID DISORDER: ICD-10-CM

## 2024-11-01 DIAGNOSIS — Z13.6 SCREENING FOR CARDIOVASCULAR CONDITION: ICD-10-CM

## 2024-11-01 LAB
ALBUMIN SERPL BCG-MCNC: 4 G/DL (ref 3.5–5.2)
ALP SERPL-CCNC: 22 U/L (ref 40–150)
ALT SERPL W P-5'-P-CCNC: 18 U/L (ref 0–50)
ANION GAP SERPL CALCULATED.3IONS-SCNC: 9 MMOL/L (ref 7–15)
AST SERPL W P-5'-P-CCNC: 23 U/L (ref 0–45)
BILIRUB SERPL-MCNC: 0.2 MG/DL
BUN SERPL-MCNC: 12.2 MG/DL (ref 6–20)
CALCIUM SERPL-MCNC: 9.3 MG/DL (ref 8.8–10.4)
CHLORIDE SERPL-SCNC: 102 MMOL/L (ref 98–107)
CHOLEST SERPL-MCNC: 181 MG/DL
CREAT SERPL-MCNC: 0.75 MG/DL (ref 0.51–0.95)
EGFRCR SERPLBLD CKD-EPI 2021: >90 ML/MIN/1.73M2
ERYTHROCYTE [DISTWIDTH] IN BLOOD BY AUTOMATED COUNT: 11.9 % (ref 10–15)
EST. AVERAGE GLUCOSE BLD GHB EST-MCNC: 105 MG/DL
FASTING STATUS PATIENT QL REPORTED: YES
FASTING STATUS PATIENT QL REPORTED: YES
GLUCOSE SERPL-MCNC: 78 MG/DL (ref 70–99)
HBA1C MFR BLD: 5.3 % (ref 0–5.6)
HCO3 SERPL-SCNC: 26 MMOL/L (ref 22–29)
HCT VFR BLD AUTO: 38.9 % (ref 35–47)
HCV AB SERPL QL IA: NONREACTIVE
HDLC SERPL-MCNC: 81 MG/DL
HGB BLD-MCNC: 12.5 G/DL (ref 11.7–15.7)
LDLC SERPL CALC-MCNC: 74 MG/DL
MCH RBC QN AUTO: 30.1 PG (ref 26.5–33)
MCHC RBC AUTO-ENTMCNC: 32.1 G/DL (ref 31.5–36.5)
MCV RBC AUTO: 94 FL (ref 78–100)
NONHDLC SERPL-MCNC: 100 MG/DL
PLATELET # BLD AUTO: 275 10E3/UL (ref 150–450)
POTASSIUM SERPL-SCNC: 4.3 MMOL/L (ref 3.4–5.3)
PROT SERPL-MCNC: 7.2 G/DL (ref 6.4–8.3)
RBC # BLD AUTO: 4.15 10E6/UL (ref 3.8–5.2)
SODIUM SERPL-SCNC: 137 MMOL/L (ref 135–145)
TRIGL SERPL-MCNC: 129 MG/DL
TSH SERPL DL<=0.005 MIU/L-ACNC: 2.5 UIU/ML (ref 0.3–4.2)
VIT D+METAB SERPL-MCNC: 38 NG/ML (ref 20–50)
WBC # BLD AUTO: 6.4 10E3/UL (ref 4–11)

## 2024-11-01 PROCEDURE — 85027 COMPLETE CBC AUTOMATED: CPT | Performed by: OBSTETRICS & GYNECOLOGY

## 2024-11-01 PROCEDURE — 83036 HEMOGLOBIN GLYCOSYLATED A1C: CPT | Performed by: OBSTETRICS & GYNECOLOGY

## 2024-11-01 PROCEDURE — 99396 PREV VISIT EST AGE 40-64: CPT | Performed by: OBSTETRICS & GYNECOLOGY

## 2024-11-01 PROCEDURE — 84443 ASSAY THYROID STIM HORMONE: CPT | Performed by: OBSTETRICS & GYNECOLOGY

## 2024-11-01 PROCEDURE — 82306 VITAMIN D 25 HYDROXY: CPT | Performed by: OBSTETRICS & GYNECOLOGY

## 2024-11-01 PROCEDURE — 80053 COMPREHEN METABOLIC PANEL: CPT | Performed by: OBSTETRICS & GYNECOLOGY

## 2024-11-01 PROCEDURE — 80061 LIPID PANEL: CPT | Performed by: OBSTETRICS & GYNECOLOGY

## 2024-11-01 PROCEDURE — 86803 HEPATITIS C AB TEST: CPT | Performed by: OBSTETRICS & GYNECOLOGY

## 2024-11-01 PROCEDURE — 36415 COLL VENOUS BLD VENIPUNCTURE: CPT | Performed by: OBSTETRICS & GYNECOLOGY

## 2024-11-01 RX ORDER — VALACYCLOVIR HYDROCHLORIDE 1 G/1
TABLET, FILM COATED ORAL
Qty: 12 TABLET | Refills: 3 | Status: SHIPPED | OUTPATIENT
Start: 2024-11-01

## 2024-11-01 RX ORDER — NORETHINDRONE ACETATE AND ETHINYL ESTRADIOL 1.5-30(21)
1 KIT ORAL DAILY
Qty: 84 TABLET | Refills: 4 | Status: SHIPPED | OUTPATIENT
Start: 2024-11-01

## 2024-12-14 DIAGNOSIS — Z78.9 USES BIRTH CONTROL: ICD-10-CM

## 2024-12-16 RX ORDER — NORETHINDRONE ACETATE AND ETHINYL ESTRADIOL AND FERROUS FUMARATE 1.5-30(21)
1 KIT ORAL DAILY
Qty: 84 TABLET | Refills: 4 | Status: SHIPPED | OUTPATIENT
Start: 2024-12-16

## 2024-12-16 NOTE — TELEPHONE ENCOUNTER
Requested Prescriptions   Pending Prescriptions Disp Refills    JUNEL FE 1.5/30 1.5-30 MG-MCG tablet [Pharmacy Med Name: JUNEL FE 1.5/30 TABLETS 28S] 84 tablet 4     Sig: TAKE 1 TABLET BY MOUTH DAILY       Contraceptives Protocol Passed - 12/16/2024 12:19 PM        Passed - Patient is not a current smoker if age is 35 or older        Passed - Medication is active on med list        Passed - Recent (12 mo) or future (90 days) visit within the authorizing provider's specialty     The patient must have completed an in-person or virtual visit within the past 12 months or has a future visit scheduled within the next 90 days with the authorizing provider s specialty.  Urgent care and e-visits do not qualify as an office visit for this protocol.          Passed - Medication indicated for associated diagnosis     Medication is associated with one or more of the following diagnoses:  Contraception  Acne  Dysmenorrhea  Menorrhagia  Amenorrhea  PCOS  Premenstrual Dysphoric Disorder  Irregular menses  Endometriosis  Contraceptive counseling  Finding of menstrual bleeding  Education about oral contraception  Uses contraception  Initial prescription of oral contraception  Oral contraception-no problem  Oral contraceptive repeat          Passed - No active pregnancy on record        Passed - No positive pregnancy test in past 12 months           Last Written Prescription Date:  11/1/24  Last Fill Quantity: 84,  # refills: 4   Last office visit: 11/1/2024 ; last virtual visit: Visit date not found with prescribing provider:  Mckinley   Future Office Visit:      Pt requesting pharmacy change. Prescription approved per [facility] Refill Protocol.    Laura Paul RN on 12/16/2024 at 12:20 PM  [facility] Triage

## 2025-01-13 ENCOUNTER — ANCILLARY PROCEDURE (OUTPATIENT)
Dept: MAMMOGRAPHY | Facility: CLINIC | Age: 40
End: 2025-01-13
Payer: COMMERCIAL

## 2025-01-13 DIAGNOSIS — Z12.31 VISIT FOR SCREENING MAMMOGRAM: ICD-10-CM

## 2025-01-13 PROCEDURE — 77067 SCR MAMMO BI INCL CAD: CPT | Mod: TC | Performed by: RADIOLOGY

## 2025-01-13 PROCEDURE — 77063 BREAST TOMOSYNTHESIS BI: CPT | Mod: TC | Performed by: RADIOLOGY

## 2025-04-14 ENCOUNTER — MYC REFILL (OUTPATIENT)
Dept: OBGYN | Facility: CLINIC | Age: 40
End: 2025-04-14
Payer: COMMERCIAL

## 2025-04-14 DIAGNOSIS — J45.20 MILD INTERMITTENT ASTHMA WITHOUT COMPLICATION: ICD-10-CM

## 2025-04-14 DIAGNOSIS — B00.1 COLD SORE: ICD-10-CM

## 2025-04-14 RX ORDER — VALACYCLOVIR HYDROCHLORIDE 1 G/1
TABLET, FILM COATED ORAL
Qty: 12 TABLET | Refills: 3 | OUTPATIENT
Start: 2025-04-14

## 2025-04-14 NOTE — TELEPHONE ENCOUNTER
"Requested Prescriptions   Pending Prescriptions Disp Refills    albuterol (PROAIR HFA/PROVENTIL HFA/VENTOLIN HFA) 108 (90 Base) MCG/ACT inhaler 8.5 g 0     Sig: Inhale 2 puffs into the lungs every 4 hours as needed for wheezing.       Short-Acting Beta Agonist Inhalers Protocol  Failed - 4/14/2025 11:34 AM        Failed - Asthma control assessment score within normal limits in last 6 months     Please review ACT score.           Passed - Patient is age 12 or older        Passed - Medication is active on med list and the sig matches. RN to manually verify dose and sig if red X/fail.     If the protocol passes (green check), you do not need to verify med dose and sig.    A prescription matches if they are the same clinical intention.    For Example: once daily and every morning are the same.    The protocol can not identify upper and lower case letters as matching and will fail.     For Example: Take 1 tablet (50 mg) by mouth daily     TAKE 1 TABLET (50 MG) BY MOUTH DAILY    For all fails (red x), verify dose and sig.    If the refill does match what is on file, the RN can still proceed to approve the refill request.       If they do not match, route to the appropriate provider.             Passed - Recent (6 mo) or future (90 days) visit within the authorizing provider's specialty     Patient had office visit in the last 6 months or has a visit in the next 30 days with authorizing provider or within the authorizing provider's specialty.  See \"Patient Info\" tab in inbasket, or \"Choose Columns\" in Meds & Orders section of the refill encounter.              valACYclovir (VALTREX) 1000 mg tablet 12 tablet 3     Sig: TAKE 2 TABLETS BY MOUTH TWICE DAILY FOR 1 DAY       Antivirals Passed - 4/14/2025 11:34 AM        Passed - Patient is age 12 or older        Passed - Medication is active on med list and the sig matches. RN to manually verify dose and sig if red X/fail.     If the protocol passes (green check), you do not " need to verify med dose and sig.    A prescription matches if they are the same clinical intention.    For Example: once daily and every morning are the same.    The protocol can not identify upper and lower case letters as matching and will fail.     For Example: Take 1 tablet (50 mg) by mouth daily     TAKE 1 TABLET (50 MG) BY MOUTH DAILY    For all fails (red x), verify dose and sig.    If the refill does match what is on file, the RN can still proceed to approve the refill request.       If they do not match, route to the appropriate provider.             Passed - Recent (12 mo) or future (90 days) visit within the authorizing provider's specialty     The patient must have completed an in-person or virtual visit within the past 12 months or has a future visit scheduled within the next 90 days with the authorizing provider s specialty.  Urgent care and e-visits do not qualify as an office visit for this protocol.          Passed - Medication indicated for associated diagnosis     Medication is associated with one or more of the following diagnoses:     Genital herpes simplex   Herpes simples   Herpes zoster, shingles   Varicella   Recurrent herpes simplex labialis   HIV infection   Varicella-zoster virus infection, prophylaxis             albuterol (PROAIR HFA/PROVENTIL HFA/VENTOLIN HFA) 108 (90 Base) MCG/ACT inhaler   Last Written Prescription Date:  11/13/23  Last Fill Quantity: 8.5g,  # refills: 0   Last office visit: 11/1/2024 annual ; last virtual visit: Visit date not found with prescribing provider:  Mckinley Akins Office Visit:  1/16/26 - Mckinley     valACYclovir (VALTREX) 1000 mg tablet   Last Written Prescription Date:  11/1/24  Last Fill Quantity: 12,  # refills: 3   Last office visit: 11/1/2024 annual; last virtual visit: Visit date not found with prescribing provider:  Mckinley    [facility] Office Visit:  1/16/26 - Mckinley     Valtrex Rx denied - should have refills on file    Albuterol last filled  11/13/23 - ok for refill or recommend follow up with PCP?    Routing to provider to advise     Laura Paul RN on 4/14/2025 at 11:37 AM  [facility] Triage

## 2025-04-15 RX ORDER — ALBUTEROL SULFATE 90 UG/1
2 INHALANT RESPIRATORY (INHALATION) EVERY 4 HOURS PRN
Qty: 8.5 G | Refills: 1 | Status: SHIPPED | OUTPATIENT
Start: 2025-04-15

## (undated) DEVICE — SOL NACL 0.9% IRRIG 1000ML BOTTLE 07138-09

## (undated) DEVICE — SU VICRYL 3-0 CT-1 36" J344H

## (undated) DEVICE — ESU GROUND PAD UNIVERSAL W/O CORD

## (undated) DEVICE — SU VICRYL 0 CTX 36" J370H

## (undated) DEVICE — SU VICRYL 0 CT 36" J358H

## (undated) DEVICE — BLADE CLIPPER 4406

## (undated) DEVICE — DRSG STERI STRIP 1/2X4" R1547

## (undated) DEVICE — SUCTION CANISTER MEDIVAC LINER 3000ML W/LID 65651-530

## (undated) DEVICE — CATH TRAY FOLEY 16FR BARDEX W/DRAIN BAG STATLOCK 300316A

## (undated) DEVICE — GLOVE PROTEXIS POWDER FREE 7.0 ORTHOPEDIC 2D73ET70

## (undated) DEVICE — GLOVE PROTEXIS W/NEU-THERA 7.0  2D73TE70

## (undated) DEVICE — PREP CHLORAPREP 26ML TINTED ORANGE  260815

## (undated) DEVICE — GLOVE PROTEXIS BLUE W/NEU-THERA 7.0  2D73EB70

## (undated) DEVICE — LINEN C-SECTION 5415

## (undated) DEVICE — PACK C-SECTION LF PL15OTA83B